# Patient Record
Sex: MALE | Race: WHITE | NOT HISPANIC OR LATINO | Employment: STUDENT | URBAN - METROPOLITAN AREA
[De-identification: names, ages, dates, MRNs, and addresses within clinical notes are randomized per-mention and may not be internally consistent; named-entity substitution may affect disease eponyms.]

---

## 2021-06-04 ENCOUNTER — TELEPHONE (OUTPATIENT)
Dept: ORTHOPEDICS | Facility: CLINIC | Age: 21
End: 2021-06-04

## 2021-06-04 DIAGNOSIS — M51.26 DISPLACEMENT OF LUMBAR INTERVERTEBRAL DISC WITHOUT MYELOPATHY: Primary | ICD-10-CM

## 2021-06-07 DIAGNOSIS — Z13.6 SCREENING FOR HEART DISEASE: ICD-10-CM

## 2021-06-08 ENCOUNTER — ANCILLARY PROCEDURE (OUTPATIENT)
Dept: MRI IMAGING | Facility: CLINIC | Age: 21
End: 2021-06-08
Attending: ORTHOPAEDIC SURGERY
Payer: COMMERCIAL

## 2021-06-08 DIAGNOSIS — M51.26 DISPLACEMENT OF LUMBAR INTERVERTEBRAL DISC WITHOUT MYELOPATHY: ICD-10-CM

## 2021-06-08 LAB — INTERPRETATION ECG - MUSE: NORMAL

## 2021-06-08 PROCEDURE — 72148 MRI LUMBAR SPINE W/O DYE: CPT | Performed by: RADIOLOGY

## 2021-06-09 ENCOUNTER — OFFICE VISIT (OUTPATIENT)
Dept: ORTHOPEDICS | Facility: CLINIC | Age: 21
End: 2021-06-09
Payer: COMMERCIAL

## 2021-06-09 VITALS
HEART RATE: 66 BPM | TEMPERATURE: 97.8 F | WEIGHT: 170 LBS | BODY MASS INDEX: 21.82 KG/M2 | SYSTOLIC BLOOD PRESSURE: 135 MMHG | HEIGHT: 74 IN | DIASTOLIC BLOOD PRESSURE: 81 MMHG

## 2021-06-09 DIAGNOSIS — G89.29 CHRONIC LEFT-SIDED LOW BACK PAIN WITH LEFT-SIDED SCIATICA: Primary | ICD-10-CM

## 2021-06-09 DIAGNOSIS — Z02.5 SPORTS PHYSICAL: Primary | ICD-10-CM

## 2021-06-09 DIAGNOSIS — M54.42 CHRONIC LEFT-SIDED LOW BACK PAIN WITH LEFT-SIDED SCIATICA: Primary | ICD-10-CM

## 2021-06-09 ASSESSMENT — MIFFLIN-ST. JEOR: SCORE: 1850.86

## 2021-06-09 NOTE — LETTER
Date:Leta 10, 2021      Patient was self referred, no letter generated. Do not send.        [facility] Health Information

## 2021-06-09 NOTE — LETTER
6/9/2021      RE: Francesco Oro  [address] [address] [facility] [address]       Service Date: 06/09/2021    CHIEF COMPLAINT:  Low back pain.    HISTORY OF PRESENT ILLNESS:  Francesco Oro is a 21-year-old incoming [employer]  with a long history of back issues.  He underwent a laminectomy by Dr. Nick Rosenberg at [facility] in 05/2020.  He was able to return to play with the [employer] in 12/2020.  He actually tells me his back pain has not been improved much from the surgery.  He may have slightly less pain in his left leg.  However, he still has some issues with extensive play.  He notes symptoms when he plays 3 games in a row.    Francesco denies numbness or tingling that goes below his knee.  He has no feelings of weakness.  He has no bowel or bladder issues.    PHYSICAL EXAMINATION:  21-year-old male, alert and oriented, in no apparent distress.  Full range of motion of the lumbar spine without discomfort.  Lower extremity strength 5/5.  He has a negative contralateral straight leg raise.  He does have some pain with ipsilateral straight leg raise.  He has a negative HANNAH test.    MRI:  I personally reviewed the patient's recent lumbar spine MRI dated 06/08.  The patient has evidence of a previous left sided hemilaminectomy.  There is some minimal disk bulging at L4-L5. 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    IMPRESSION:  21-year-old incoming  with low back pain following decompression surgery.  Francesco, his  and I had a long conversation about his back.  We discussed that this is a preexisting condition.  We discussed the fact that participating in Division I athletics may put excessive load on his spine and this could accelerate degenerative changes.  I asked Francesco if he would like to see one of our [facility] spine surgeons for an opinion.  At this point, he would like to wait and see how the next few weeks of training go.  I think this is reasonable.      PLAN:  1.  Francesco  will be cleared to participate from an orthopedic standpoint in hockey training .  2.  He will let us know if his symptoms worsen and he would like to get an opinion from one of my spine partners.    Kali Rubalcava MD        D: 2021   T: 06/10/2021   MT: domenic    Name:     RUDY COYNE  MRN:      [id number]        Account:      [id number]   :      2000           Service Date: 2021       Document: [id number]      Kali Rubalcava MD

## 2021-06-09 NOTE — LETTER
Date:June 14, 2021      Patient was self referred, no letter generated. Do not send.        [facility] Health Information

## 2021-06-09 NOTE — PROGRESS NOTES
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  He is generally healthy.  He does have a history of lumbar radiculopathy, he underwent surgery for radicular left leg pain prior to the last hockey season.  Unfortunately his pain is coming back.    He does report having a slightly abnormal EKG in the past, a physician had discussed this with him and cleared him as he was asymptomatic.    He is getting over a upper respiratory infection.    Vitals: /81   Pulse 66   Temp 97.8  F (36.6  C)   Ht 1.88 m (6' 2\")   Wt 77.1 kg (170 lb)   BMI 21.83 kg/m    BMI= Body mass index is 21.83 kg/m .  Sport(s): Ice Hockey    Vision: Right Eye: 20/20 Left Eye: 20/20 Both Eyes: 20/20  Correction: none  Pupils: equal    Concussions: Concussion fact sheet reviewed. Student Athlete gave written and verbal agreement to report any suspected concussions.    General/Medical  Eyes/Vision: Normal  Ears/Hearing: Normal  Nose: Normal  Mouth/Dental: Normal  Throat: petechiae at roof of mouth  Thyroid: Normal  Lymph Nodes: left cervical LAD  Lungs: Normal  Abdomen: Normal  Skin: Normal    Musculoskeletal/Orthopaedic  Per orthopedist    Cardiovascular Screening  Heart Murmur:No   Symmetric Femoral pulses: Yes    Stigmata of Marfan's Syndrome - if appropriate:  Not applicable    EKG read as septal infarct, likely artifact given his lack of symptomatology and prior history of an abnormal finding on his EKG.    COMMENTS, RECOMMENDATIONS and PARTICIPATION STATUS  Cleared for hockey participation from a medical perspective.    Bari Rea, DO ROSAM      "

## 2021-06-09 NOTE — LETTER
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  He is generally healthy.  He does have a history of lumbar radiculopathy, he underwent surgery for radicular left leg pain prior to the last hockey season.  Unfortunately his pain is coming back.    He does report having a slightly abnormal EKG in the past, a physician had discussed this with him and cleared him as he was asymptomatic.    He is getting over a upper respiratory infection.    Vitals: /81   Pulse 66   Temp 97.8  F (36.6  C)   Ht 1.88 m (6' 2\")   Wt 77.1 kg (170 lb)   BMI 21.83 kg/m    BMI= Body mass index is 21.83 kg/m .  Sport(s): Ice Hockey    Vision: Right Eye: 20/20 Left Eye: 20/20 Both Eyes: 20/20  Correction: none  Pupils: equal    Concussions: Concussion fact sheet reviewed. Student Athlete gave written and verbal agreement to report any suspected concussions.    General/Medical  Eyes/Vision: Normal  Ears/Hearing: Normal  Nose: Normal  Mouth/Dental: Normal  Throat: petechiae at roof of mouth  Thyroid: Normal  Lymph Nodes: left cervical LAD  Lungs: Normal  Abdomen: Normal  Skin: Normal    Musculoskeletal/Orthopaedic  Per orthopedist    Cardiovascular Screening  Heart Murmur:No   Symmetric Femoral pulses: Yes    Stigmata of Marfan's Syndrome - if appropriate:  Not applicable    EKG read as septal infarct, likely artifact given his lack of symptomatology and prior history of an abnormal finding on his EKG.    COMMENTS, RECOMMENDATIONS and PARTICIPATION STATUS  Cleared for hockey participation from a medical perspective.    Bari Rea DO CAVERÓNICAM          Prashant Rea, DO    "

## 2021-06-10 NOTE — PROGRESS NOTES
Service Date: 06/09/2021    CHIEF COMPLAINT:  Low back pain.    HISTORY OF PRESENT ILLNESS:  Francesco Oro is a 21-year-old incoming [x+1]  with a long history of back issues.  He underwent a laminectomy by Dr. Nick Rosenberg at [facility] [place] in 05/2020.  He was able to return to play with the [employer] in 12/2020.  He actually tells me his back pain has not been improved much from the surgery.  He may have slightly less pain in his left leg.  However, he still has some issues with extensive play.  He notes symptoms when he plays 3 games in a row.    Francesco denies numbness or tingling that goes below his knee.  He has no feelings of weakness.  He has no bowel or bladder issues.    PHYSICAL EXAMINATION:  21-year-old male, alert and oriented, in no apparent distress.  Full range of motion of the lumbar spine without discomfort.  Lower extremity strength 5/5.  He has a negative contralateral straight leg raise.  He does have some pain with ipsilateral straight leg raise.  He has a negative HANNAH test.    MRI:  I personally reviewed the patient's recent lumbar spine MRI dated 06/08.  The patient has evidence of a previous left sided hemilaminectomy.  There is some minimal disk bulging at L4-L5. 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    IMPRESSION:  21-year-old incoming  with low back pain following decompression surgery.  Francesco, his  and I had a long conversation about his back.  We discussed that this is a preexisting condition.  We discussed the fact that participating in Division I athletics may put excessive load on his spine and this could accelerate degenerative changes.  I asked Francesco if he would like to see one of our [facility] spine surgeons for an opinion.  At this point, he would like to wait and see how the next few weeks of training go.  I think this is reasonable.      PLAN:  1.  Francesco will be cleared to participate from an orthopedic standpoint in hockey training  .  2.  He will let us know if his symptoms worsen and he would like to get an opinion from one of my spine partners.    Kali Rubalcava MD        D: 2021   T: 06/10/2021   MT: domenic    Name:     RUDY COYNE  MRN:      [id number]        Account:      [id number]   :      2000           Service Date: 2021       Document: [id number]

## 2021-06-30 ENCOUNTER — ANCILLARY PROCEDURE (OUTPATIENT)
Dept: GENERAL RADIOLOGY | Facility: CLINIC | Age: 21
End: 2021-06-30
Attending: ORTHOPAEDIC SURGERY
Payer: COMMERCIAL

## 2021-06-30 ENCOUNTER — OFFICE VISIT (OUTPATIENT)
Dept: ORTHOPEDICS | Facility: CLINIC | Age: 21
End: 2021-06-30
Payer: COMMERCIAL

## 2021-06-30 DIAGNOSIS — Z47.89 ORTHOPEDIC AFTERCARE: ICD-10-CM

## 2021-06-30 DIAGNOSIS — S20.219D STERNAL CONTUSION, SUBSEQUENT ENCOUNTER: Primary | ICD-10-CM

## 2021-06-30 PROCEDURE — 71120 X-RAY EXAM BREASTBONE 2/>VWS: CPT | Performed by: RADIOLOGY

## 2021-06-30 NOTE — LETTER
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  He dropped a weight training apparatus on his chest.  [place] [facility] [place] where he underwent radiographs and CT scanning.  His images revealed no acute fracture.    Francesco's pain has improved.  However he still is having difficulty with physical activity.  He does not really have complaints of shortness of breath.  He has no difficulty swallowing.    Physical Examination:  21-year-old male alert oriented no apparent distress.  Slightly tender on the mid sternum.  Slightly tender on the anterior ribs.  No crepitation noted.    Imaging:  I personally reviewed his radiographs from today.  There is no evidence of sternal fracture.  There is no evidence of rib fracture.    Impression:  21-year-old man's  with a sternal contusion.  Francesco symptoms are slowly improving.  I think he can gradually increase his aerobic training.  He can skate when comfortable.  We can start a very graduated return to weight training.    Plan:  1. Graduated return to activity  2. Follow-up with me on a as needed basis  3. Cleared to play per his athletic trainers discretion.        Kali Rubalcava MD

## 2021-06-30 NOTE — LETTER
Date:July 15, 2021      Patient was self referred, no letter generated. Do not send.        [facility] Health Information

## 2021-07-04 NOTE — PROGRESS NOTES
Chief Complaint: Chest injury    Sport:  Men's hockey    History of Present Illness:  Francesco Oro is a 21-year-old go[employer]ip[employer]  who injured his sternum on June 17.  He dropped a weight training apparatus on his chest.  He was taken to [facility] emergency department where he underwent radiographs and CT scanning.  His images revealed no acute fracture.    Francesco's pain has improved.  However he still is having difficulty with physical activity.  He does not really have complaints of shortness of breath.  He has no difficulty swallowing.    Physical Examination:  21-year-old male alert oriented no apparent distress.  Slightly tender on the mid sternum.  Slightly tender on the anterior ribs.  No crepitation noted.    Imaging:  I personally reviewed his radiographs from today.  There is no evidence of sternal fracture.  There is no evidence of rib fracture.    Impression:  21-year-old man's  with a sternal contusion.  Francesco symptoms are slowly improving.  I think he can gradually increase his aerobic training.  He can skate when comfortable.  We can start a very graduated return to weight training.    Plan:  1. Graduated return to activity  2. Follow-up with me on a as needed basis  3. Cleared to play per his athletic trainers discretion.

## 2021-07-16 ENCOUNTER — DOCUMENTATION ONLY (OUTPATIENT)
Dept: FAMILY MEDICINE | Facility: CLINIC | Age: 21
End: 2021-07-16
Payer: COMMERCIAL

## 2022-01-08 NOTE — PROGRESS NOTES
[facility] ATHLETICS  Alicia ATC treatment plan note  Injury: Low back pain   Date of injury: 07.16.21    Date: 07.16.21  Treatment: mercedes Bob ATC

## 2022-01-11 DIAGNOSIS — U07.1 CLINICAL DIAGNOSIS OF COVID-19: Primary | ICD-10-CM

## 2022-01-16 ENCOUNTER — OFFICE VISIT (OUTPATIENT)
Dept: FAMILY MEDICINE | Facility: CLINIC | Age: 22
End: 2022-01-16
Payer: COMMERCIAL

## 2022-01-16 DIAGNOSIS — U07.1 CLINICAL DIAGNOSIS OF COVID-19: Primary | ICD-10-CM

## 2022-01-16 NOTE — LETTER
1/16/2022      RE: Francesco Oro  [address] [address] [address]       HISTORY OF PRESENT ILLNESS  Mr. Oro is a pleasant 21 year old year old male here for Covid clearance exam  No symptoms  Feels well  Completed isolation and cardiac testing      MEDICAL HISTORY  There is no problem list on file for this patient.      No current outpatient medications on file.       Allergies   Allergen Reactions     Penicillin G      Sulfa Drugs        No family history on file.  Social History     Socioeconomic History     Marital status: Single     Spouse name: Not on file     Number of children: Not on file     Years of education: Not on file     Highest education level: Not on file   Occupational History     Not on file   Tobacco Use     Smoking status: Not on file     Smokeless tobacco: Not on file   Substance and Sexual Activity     Alcohol use: Not on file     Drug use: Not on file     Sexual activity: Not on file   Other Topics Concern     Not on file   Social History Narrative     Not on file     Social Determinants of Health     Financial Resource Strain: Not on file   Food Insecurity: Not on file   Transportation Needs: Not on file   Physical Activity: Not on file   Stress: Not on file   Social Connections: Not on file   Intimate Partner Violence: Not on file   Housing Stability: Not on file       Additional medical/Social/Surgical histories reviewed in [facility] and updated as appropriate.     REVIEW OF SYSTEMS (1/16/2022)  10 point ROS of systems including Constitutional, Eyes, Respiratory, Cardiovascular, Gastroenterology, Genitourinary, Integumentary, Musculoskeletal, Psychiatric, Allergic/Immunologic were all negative except for pertinent positives noted in my HPI.     PHYSICAL EXAM  Exam:  Constitutional: healthy, alert and no distress  Head: Normocephalic. No masses, lesions, tenderness or abnormalities  Neck: Neck supple. No adenopathy. Thyroid symmetric, normal size,, Carotids without bruits.  ENT: ENT  exam normal, no neck nodes or sinus tenderness  Cardiovascular: negative, PMI normal. No lifts, heaves, or thrills. RRR. No murmurs, clicks gallops or rub  Respiratory: negative, Percussion normal. Good diaphragmatic excursion. Lungs clear  Gastrointestinal: Abdomen soft, non-tender. BS normal. No masses, organomegaly    Skin: no suspicious lesions or rashes  Neurologic: Gait normal. Reflexes normal and symmetric. Sensation grossly WNL.  Psychiatric: mentation appears normal and affect normal/bright  Hematologic/Lymphatic/Immunologic: Normal cervical lymph nodes  ASSESSMENT & PLAN  22 yo male s/p covid infection, improved, clearance exam    I independently reviewed the following imaging studies:  troponing and EKG are WNL  No concerns  Can RTP per ATC discussed with athlete  Cleared for participation  Appropriate PPE was utilized for prevention of spread of Covid-19.  Prashant Swain MD, CAM        Prashant Swain MD

## 2022-01-16 NOTE — LETTER
Date:February 1, 2022      Patient was self referred, no letter generated. Do not send.        [facility] Health Information

## 2022-01-17 ENCOUNTER — LAB (OUTPATIENT)
Dept: LAB | Facility: CLINIC | Age: 22
End: 2022-01-17
Payer: COMMERCIAL

## 2022-01-17 DIAGNOSIS — U07.1 CLINICAL DIAGNOSIS OF COVID-19: ICD-10-CM

## 2022-01-17 LAB — TROPONIN I SERPL HS-MCNC: 5 NG/L

## 2022-01-17 PROCEDURE — 36415 COLL VENOUS BLD VENIPUNCTURE: CPT | Performed by: PATHOLOGY

## 2022-01-17 PROCEDURE — 84484 ASSAY OF TROPONIN QUANT: CPT | Performed by: PATHOLOGY

## 2022-01-18 LAB
ATRIAL RATE - MUSE: 75 BPM
DIASTOLIC BLOOD PRESSURE - MUSE: NORMAL MMHG
INTERPRETATION ECG - MUSE: NORMAL
P AXIS - MUSE: 56 DEGREES
PR INTERVAL - MUSE: 178 MS
QRS DURATION - MUSE: 78 MS
QT - MUSE: 356 MS
QTC - MUSE: 397 MS
R AXIS - MUSE: 59 DEGREES
SYSTOLIC BLOOD PRESSURE - MUSE: NORMAL MMHG
T AXIS - MUSE: 46 DEGREES
VENTRICULAR RATE- MUSE: 75 BPM

## 2022-02-02 ENCOUNTER — OFFICE VISIT (OUTPATIENT)
Dept: ORTHOPEDICS | Facility: CLINIC | Age: 22
End: 2022-02-02
Payer: COMMERCIAL

## 2022-02-02 VITALS
SYSTOLIC BLOOD PRESSURE: 131 MMHG | HEIGHT: 72 IN | BODY MASS INDEX: 23.84 KG/M2 | DIASTOLIC BLOOD PRESSURE: 74 MMHG | HEART RATE: 65 BPM | WEIGHT: 176 LBS

## 2022-02-02 DIAGNOSIS — J45.20 MILD INTERMITTENT ASTHMA, UNSPECIFIED WHETHER COMPLICATED: Primary | ICD-10-CM

## 2022-02-02 ASSESSMENT — MIFFLIN-ST. JEOR: SCORE: 1846.33

## 2022-02-02 NOTE — LETTER
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  Francesco notes that this past weekend at the games he became fatigued quite easily and felt as if he had trouble exchanging air.  This would resolve whenever he got off of the ice, although his symptoms have been continuing on a reliable basis this week.  He feels this every time he gets onto the ice or exerts himself in the waiting room.  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  He also recently got over COVID-19.        Additional history: as documented    MEDICAL HISTORY  There is no problem list on file for this patient.      No current outpatient medications on file.       Allergies   Allergen Reactions     Penicillin G      Sulfa Drugs        No family history on file.    Additional medical/Social/Surgical histories reviewed in [facility] and updated as appropriate.        PHYSICAL EXAM    Vitals:    02/02/22 0820   BP: 131/74   Pulse: 65   Weight: 79.8 kg (176 lb)   Height: 1.829 m (6')     Physical Exam  Vitals reviewed. Exam conducted with a chaperone present.   Constitutional:       Appearance: Normal appearance.   Eyes:      Conjunctiva/sclera: Conjunctivae normal.   Cardiovascular:      Rate and Rhythm: Normal rate and regular rhythm.      Pulses: Normal pulses.      Heart sounds: Normal heart sounds. No murmur heard.      Pulmonary:      Effort: Pulmonary effort is normal. No respiratory distress.      Breath sounds: Normal breath sounds. No wheezing.   Neurological:      Mental Status: He is alert.   Psychiatric:         Mood and Affect: Mood normal.         Behavior: Behavior normal.              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    Francesco and I had a good discussion centering around likely etiologies for his symptoms.  His symptoms do seem relatable to exercise and/or cold-induced asthma.    At this point I do think it is most reasonable for him to get back on his Dulera and to take albuterol before every practice.  We did discuss that if this does not help his symptoms whatsoever coming into this weekend we could consider either changing inhalers or initiating a round of prednisone, if his symptoms worsen and become more persistent in his daily life.    It was a pleasure seeing Francesco today.    Prashant Rea DO, CAM  Primary Care Sports Medicine      This note was constructed using Dragon dictation software, please excuse any minor errors in spelling, grammar, or syntax.        Prashant Rea DO

## 2022-02-02 NOTE — PROGRESS NOTES
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  Francesco notes that this past weekend at the games he became fatigued quite easily and felt as if he had trouble exchanging air.  This would resolve whenever he got off of the ice, although his symptoms have been continuing on a reliable basis this week.  He feels this every time he gets onto the ice or exerts himself in the waiting room.  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  He also recently got over COVID-19.        Additional history: as documented    MEDICAL HISTORY  There is no problem list on file for this patient.      No current outpatient medications on file.       Allergies   Allergen Reactions     Penicillin G      Sulfa Drugs        No family history on file.    Additional medical/Social/Surgical histories reviewed in [facility] and updated as appropriate.        PHYSICAL EXAM    Vitals:    02/02/22 0820   BP: 131/74   Pulse: 65   Weight: 79.8 kg (176 lb)   Height: 1.829 m (6')     Physical Exam  Vitals reviewed. Exam conducted with a chaperone present.   Constitutional:       Appearance: Normal appearance.   Eyes:      Conjunctiva/sclera: Conjunctivae normal.   Cardiovascular:      Rate and Rhythm: Normal rate and regular rhythm.      Pulses: Normal pulses.      Heart sounds: Normal heart sounds. No murmur heard.      Pulmonary:      Effort: Pulmonary effort is normal. No respiratory distress.      Breath sounds: Normal breath sounds. No wheezing.   Neurological:      Mental Status: He is alert.   Psychiatric:         Mood and Affect: Mood normal.         Behavior: Behavior normal.              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    Francesco and I had a good discussion centering around likely  etiologies for his symptoms.  His symptoms do seem relatable to exercise and/or cold-induced asthma.    At this point I do think it is most reasonable for him to get back on his Dulera and to take albuterol before every practice.  We did discuss that if this does not help his symptoms whatsoever coming into this weekend we could consider either changing inhalers or initiating a round of prednisone, if his symptoms worsen and become more persistent in his daily life.    It was a pleasure seeing Francesco today.    Prashant Rea DO, [facility]  Primary Care Sports Medicine      This note was constructed using Dragon dictation software, please excuse any minor errors in spelling, grammar, or syntax.

## 2022-02-02 NOTE — LETTER
Date:February 3, 2022      Patient was self referred, no letter generated. Do not send.        [facility] Health Information

## 2022-07-19 DIAGNOSIS — M54.16 LUMBAR RADICULOPATHY: Primary | ICD-10-CM

## 2022-07-19 RX ORDER — PREDNISONE 20 MG/1
40 TABLET ORAL DAILY
Qty: 10 TABLET | Refills: 0 | Status: SHIPPED | OUTPATIENT
Start: 2022-07-19 | End: 2022-07-24

## 2022-07-22 ENCOUNTER — ANCILLARY PROCEDURE (OUTPATIENT)
Dept: MRI IMAGING | Facility: CLINIC | Age: 22
End: 2022-07-22
Attending: FAMILY MEDICINE
Payer: COMMERCIAL

## 2022-07-22 DIAGNOSIS — M54.50 LUMBAR SPINE PAIN: ICD-10-CM

## 2022-07-22 PROCEDURE — 72148 MRI LUMBAR SPINE W/O DYE: CPT | Mod: GC | Performed by: RADIOLOGY

## 2022-08-26 DIAGNOSIS — M54.16 LUMBAR RADICULOPATHY: Primary | ICD-10-CM

## 2022-08-26 DIAGNOSIS — M51.26 HERNIATED LUMBAR INTERVERTEBRAL DISC: ICD-10-CM

## 2022-08-26 NOTE — TELEPHONE ENCOUNTER
DIAGNOSIS: Dr. Swain.  for [facility]. discussion and planning on treatment for some herniated discs in his lumbar spine. RN called and spoke with CHARIS Skaggs   APPOINTMENT DATE: 8/30/22   NOTES STATUS DETAILS   OFFICE NOTE from referring provider Internal Phone note 7/21/22- Álvaro   OFFICE NOTE from other specialist Internal [facility] ov/notes 2/2/22-6/9/21   LABS     MRI Internal MR Lumbar spine done 7/22/22  MR Lumbar spine done 6/8/21   XRAYS (IMAGES & REPORTS) Internal XR Lumbar Spine done 8/26/22   TUMOR

## 2022-08-29 NOTE — PROGRESS NOTES
Spine Surgery Consultation    REFERRING PHYSICIAN: No ref. provider found   PRIMARY CARE PHYSICIAN: [employer]           Chief Complaint:   Consult (referred by Dr. Swain.  for [facility]. 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 The patient is s/p L4-5 microdiscectomy and laminectomy with Dr. Rosenberg at [facility] in 5/2020. Prior to that surgery he had conservative management with steroid injections x4, PT, and gabapentin with minimal to no relief. Following surgery, he felt mild to no relief in back pain and left lower extremity radicular symptoms. Today, he is presenting following a referal by Dr. Swain for management of lumbar disc herniations seen on MRI and persistent lower back pain that radiates down his left lower extremity . Symptoms include pain radiating down posterior thighs. Aggravating activities include sitting for prolonged periods of time. Alleviating activities include rest.  The patient is currently active training with the [employer].  He reports that aggravation of symptoms while playing hockey.         Past Medical History:   No past medical history on file.         Past Surgical History:   No past surgical history on file.         Social History:     Social History     Tobacco Use     Smoking status: Not on file     Smokeless tobacco: Not on file   Substance Use Topics     Alcohol use: Not on file            Family History:   No family history on file.         Allergies:     Allergies   Allergen Reactions     Penicillin G      Sulfa Drugs             Medications:     No current outpatient medications on file.     No current facility-administered medications for this  visit.             Review of Systems:     A 10 point ROS was performed and reviewed. Specific responses to these questions are noted at the end of the document.         Physical Exam:   Vitals: Ht 1.829 m (6')   Wt 80.7 kg (178 lb)   BMI 24.14 kg/m    Constitutional: awake, alert, cooperative, no apparent distress, appears stated age.    Eyes: The sclera are white.  Ears, Nose, Throat: The trachea is midline.  Psychiatric: The patient has a normal affect.  Respiratory: breathing non-labored  Cardiovascular: The extremities are warm and perfused.  Skin: no obvious rashes or lesions.  Musculoskeletal, Neurologic, and Spine:      Lumbar Spine:    Appearance - No gross stepoffs or deformities    Motor -     L2-3: Hip flexion R 5/5  And L 5/5 strength          L3/4:  Knee extension R 5/5 and L 5/5 strength         L4/5:  Foot dorsiflexion R 5/5 L 5/5 and       EHL dorsiflexion R 4/5 L 4/5 strength         S1:  Plantarflexion/Peroneal Muscles  R 5/5 and L 5/5 strength    Sensation: intact to light touch L3-S1 distribution BLE          Neurologic:      REFLEXES Right Left   Patella 2+ 2+   Ankle jerk 2+ 2+   Babinski No upgoing great toe No upgoing great toe   Clonus 0 beats 0 beats     Hip Exam:  No pain with hip log roll and no tenderness over the greater trochanters.    Alignment:  Patient stands with a neutral standing sagittal balance.           Imaging:   We ordered and independently reviewed new radiographs at this clinic visit. The results were discussed with the patient.  Findings include:    7/22/22 RMI   1.  Postsurgical changes of left hemilaminectomy L4-5 similar to  prior.  2.  Mild lumbar spondylosis at L4-5 and L5-S1. While the small disc  bulge and left subarticular protrusion at L5-S1 is mildly increased  from 6/8/2021, there is no resultant spinal canal or neural foraminal  stenosis.             Assessment and Plan:   Assessment:  22 year old male s/p L4-5 microdiscectomy and laminectomy with Dr. Rosenberg  at [facility] in 5/2020 presents following a referal by Dr. Swain for management of lumbar disc herniations seen on MRI.  The patient's imaging findings are mild with no significant deformity, compression of the nerve, instability/fracture, or residual disc material from his previous surgery.  He is not as candidate for surgical intervention at this time.  Discussed nonsurgical treatment options including physical therapy, medications, injections, massage therapy.  Physical therapy likely would not add much to the patient's intensive workout regiment with overall activity. Patient will discuss glut/hamstring strengthening exercises and message therapy with his . If no improvement following these interventions, the patient may return to clinic as needed and we will schedule a steroid injection at that time.     Mansoor Gamboa MD   Orthopaedic resident, PGY-1    Attending MD (Dr. Leoncio Jimenez) :  I reviewed and verified the history and physical exam of the patient and discussed the patient's management with the other clinical providers involved in this patient's care including any involved residents or physicians assistants. I reviewed the above note and agree with the documented findings and plan of care, which were communicated to the patient.      Leoncio Jimenez MD        Respectfully,  Leoncio Jimenez MD  Spine Surgery  [facility]

## 2022-08-30 ENCOUNTER — PRE VISIT (OUTPATIENT)
Dept: ORTHOPEDICS | Facility: CLINIC | Age: 22
End: 2022-08-30

## 2022-08-30 ENCOUNTER — OFFICE VISIT (OUTPATIENT)
Dept: ORTHOPEDICS | Facility: CLINIC | Age: 22
End: 2022-08-30
Payer: COMMERCIAL

## 2022-08-30 ENCOUNTER — ANCILLARY PROCEDURE (OUTPATIENT)
Dept: GENERAL RADIOLOGY | Facility: CLINIC | Age: 22
End: 2022-08-30
Attending: ORTHOPAEDIC SURGERY
Payer: COMMERCIAL

## 2022-08-30 VITALS — BODY MASS INDEX: 24.11 KG/M2 | WEIGHT: 178 LBS | HEIGHT: 72 IN

## 2022-08-30 DIAGNOSIS — M54.16 LUMBAR RADICULOPATHY: ICD-10-CM

## 2022-08-30 DIAGNOSIS — M51.26 HERNIATED LUMBAR INTERVERTEBRAL DISC: ICD-10-CM

## 2022-08-30 DIAGNOSIS — M54.16 LUMBAR RADICULOPATHY: Primary | ICD-10-CM

## 2022-08-30 PROCEDURE — 72110 X-RAY EXAM L-2 SPINE 4/>VWS: CPT | Performed by: STUDENT IN AN ORGANIZED HEALTH CARE EDUCATION/TRAINING PROGRAM

## 2022-08-30 PROCEDURE — 99203 OFFICE O/P NEW LOW 30 MIN: CPT | Mod: GC | Performed by: ORTHOPAEDIC SURGERY

## 2022-08-30 NOTE — NURSING NOTE
Reason For Visit:   Chief Complaint   Patient presents with     Consult     referred by Dr. Swain.  for [facility]. discussion and planning on treatment for some herniated discs in his lumbar spine       Primary MD: [employer]  Ref. MD: Dr. Swain    ?  No  Occupation: full time student   Currently working? No.  Date of surgery: Early may   Type of surgery: Microdiscectomy L4-L5  Smoker: No  Request smoking cessation information: No    Ht 1.829 m (6')   Wt 80.7 kg (178 lb)   BMI 24.14 kg/m      Pain Assessment  Patient Currently in Pain: Yes  Patient's Stated Pain Goal: 2 (off and on)    Oswestry (CHERI) Questionnaire    No flowsheet data found.         Neck Disability Index (NDI) Questionnaire    No flowsheet data found.                Promis 10 Assessment    No flowsheet data found.             Parul Oglesby

## 2022-08-30 NOTE — LETTER
Date:September 2, 2022      Provider requested that no letter be sent. Do not send.       [facility]

## 2022-08-30 NOTE — LETTER
8/30/2022         RE: Francesco Oro  [address] [address]        Dear Colleague,    Thank you for referring your patient, Francesco Oro, to the [facility] [place]. Please see a copy of my visit note below.    Spine Surgery Consultation    REFERRING PHYSICIAN: No ref. provider found   PRIMARY CARE PHYSICIAN: [employer]           Chief Complaint:   Consult (referred by Dr. Swain.  for [facility]. 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 The patient is s/p L4-5 microdiscectomy and laminectomy with Dr. Rosenberg at [facility] in 5/2020. Prior to that surgery he had conservative management with steroid injections x4, PT, and gabapentin with minimal to no relief. Following surgery, he felt mild to no relief in back pain and left lower extremity radicular symptoms. Today, he is presenting following a referal by Dr. Swain for management of lumbar disc herniations seen on MRI and persistent lower back pain that radiates down his left lower extremity . Symptoms include pain radiating down posterior thighs. Aggravating activities include sitting for prolonged periods of time. Alleviating activities include rest.  The patient is currently active training with the [employer].  He reports that aggravation of symptoms while playing hockey.         Past Medical History:   No past medical history on file.         Past Surgical History:   No past surgical history on file.         Social History:     Social History     Tobacco Use     Smoking status: Not on file     Smokeless tobacco: Not on file   Substance Use Topics     Alcohol use: Not on file             Family History:   No family history on file.         Allergies:     Allergies   Allergen Reactions     Penicillin G      Sulfa Drugs             Medications:     No current outpatient medications on file.     No current facility-administered medications for this visit.             Review of Systems:     A 10 point ROS was performed and reviewed. Specific responses to these questions are noted at the end of the document.         Physical Exam:   Vitals: Ht 1.829 m (6')   Wt 80.7 kg (178 lb)   BMI 24.14 kg/m    Constitutional: awake, alert, cooperative, no apparent distress, appears stated age.    Eyes: The sclera are white.  Ears, Nose, Throat: The trachea is midline.  Psychiatric: The patient has a normal affect.  Respiratory: breathing non-labored  Cardiovascular: The extremities are warm and perfused.  Skin: no obvious rashes or lesions.  Musculoskeletal, Neurologic, and Spine:      Lumbar Spine:    Appearance - No gross stepoffs or deformities    Motor -     L2-3: Hip flexion R 5/5  And L 5/5 strength          L3/4:  Knee extension R 5/5 and L 5/5 strength         L4/5:  Foot dorsiflexion R 5/5 L 5/5 and       EHL dorsiflexion R 4/5 L 4/5 strength         S1:  Plantarflexion/Peroneal Muscles  R 5/5 and L 5/5 strength    Sensation: intact to light touch L3-S1 distribution BLE          Neurologic:      REFLEXES Right Left   Patella 2+ 2+   Ankle jerk 2+ 2+   Babinski No upgoing great toe No upgoing great toe   Clonus 0 beats 0 beats     Hip Exam:  No pain with hip log roll and no tenderness over the greater trochanters.    Alignment:  Patient stands with a neutral standing sagittal balance.           Imaging:   We ordered and independently reviewed new radiographs at this clinic visit. The results were discussed with the patient.  Findings include:    7/22/22 RMI   1.  Postsurgical changes of left hemilaminectomy L4-5 similar to  prior.  2.  Mild lumbar spondylosis at L4-5 and L5-S1. While the small disc  bulge  and left subarticular protrusion at L5-S1 is mildly increased  from 6/8/2021, there is no resultant spinal canal or neural foraminal  stenosis.             Assessment and Plan:   Assessment:  22 year old male s/p L4-5 microdiscectomy and laminectomy with Dr. Rosenberg at [facility] in 5/2020 presents following a referal by Dr. Swain for management of lumbar disc herniations seen on MRI.  The patient's imaging findings are mild with no significant deformity, compression of the nerve, instability/fracture, or residual disc material from his previous surgery.  He is not as candidate for surgical intervention at this time.  Discussed nonsurgical treatment options including physical therapy, medications, injections, massage therapy.  Physical therapy likely would not add much to the patient's intensive workout regiment with overall activity. Patient will discuss glut/hamstring strengthening exercises and message therapy with his . If no improvement following these interventions, the patient may return to clinic as needed and we will schedule a steroid injection at that time.     Mansoor Gamboa MD   Orthopaedic resident, PGY-1    Attending MD (Dr. Leoncio Jimenez) :  I reviewed and verified the history and physical exam of the patient and discussed the patient's management with the other clinical providers involved in this patient's care including any involved residents or physicians assistants. I reviewed the above note and agree with the documented findings and plan of care, which were communicated to the patient.      Leoncio Jimenez MD        Respectfully,  Leoncio Jimenez MD  Spine Surgery  [facility]      Again, thank you for allowing me to participate in the care of your patient.        Sincerely,        Leoncio Jimenez MD

## 2022-09-20 ENCOUNTER — TELEPHONE (OUTPATIENT)
Dept: ORTHOPEDICS | Facility: CLINIC | Age: 22
End: 2022-09-20

## 2022-09-20 DIAGNOSIS — M51.26 HERNIATED LUMBAR INTERVERTEBRAL DISC: ICD-10-CM

## 2022-09-20 DIAGNOSIS — M54.16 LUMBAR RADICULOPATHY: Primary | ICD-10-CM

## 2022-09-20 NOTE — TELEPHONE ENCOUNTER
RN called and spoke with patient. Got a call from Dr. Swain, stating this patient needed an injection.  RN consulted with Dr. Jimenez and then placed the order.  RN faxed order to [facility].  RN explained in great detail to patient regarding this injection. If [facility] is not part of his insurance network, then pt will have to do injection with [facility].  Patient expressed understanding.    Rehan Zambrano RN

## 2022-09-21 ENCOUNTER — TELEPHONE (OUTPATIENT)
Dept: ORTHOPEDICS | Facility: CLINIC | Age: 22
End: 2022-09-21

## 2022-09-21 NOTE — TELEPHONE ENCOUNTER
RN faxed injection order to [facility].    Rehan Zambrano RN        [facility]    Phone Message    May a detailed message be left on voicemail: yes     Reason for Call: Other: [facility] needs orders faxed to them for lumbar inj fax is [phone]     Action Taken: Other: ortho     Travel Screening: Not Applicable

## 2022-11-05 ENCOUNTER — OFFICE VISIT (OUTPATIENT)
Dept: ORTHOPEDICS | Facility: CLINIC | Age: 22
End: 2022-11-05
Payer: COMMERCIAL

## 2022-11-05 DIAGNOSIS — M25.532 LEFT WRIST PAIN: Primary | ICD-10-CM

## 2022-11-05 NOTE — LETTER
11/5/2022      RE: Francesco Oro  [address] [address] [address]     Dear Colleague,    Thank you for referring your patient, Francesco Oro, to the [facility]. Please see a copy of my visit note below.    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  Francesco has pain in his left wrist that is somewhat chronic, he points to the dorsal aspect of his wrist.  This is worse in loaded extension and flexion, worse with stick handling.  He has noticed that his pain has moved central lately, as opposed to at the ulnar aspect, which is where his pain was previously.  Pain also radiates up his forearm somewhat to his elbow.        Additional history: as documented    MEDICAL HISTORY  There is no problem list on file for this patient.      Current Outpatient Medications   Medication Sig Dispense Refill     meloxicam (MOBIC) 15 MG tablet Take 1 tablet (15 mg) by mouth daily Take daily for 7 days, then daily as needed 30 tablet 0       Allergies   Allergen Reactions     Penicillin G      Sulfa Drugs        No family history on file.    Additional medical/Social/Surgical histories reviewed in EPIC and updated as appropriate.        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    Francesco's pain is near the lunate and the scapholunate junction.  Given his swelling and pain with motion I'd  recommend a brief course of anti-inflammatories on a scheduled basis.  I am prescribing meloxicam for him to take every day for a week, then on an as-needed basis after this.  He is also going to continue conservative treatment with his .    In the future we could consider imaging if this is not improving.    It was a pleasure seeing Francesco today.    Prashant Rea DO, [facility]  Primary Care Sports Medicine      This note was constructed using Dragon dictation software, please excuse any minor errors in spelling, grammar, or syntax.        Again, thank you for allowing me to participate in the care of your patient.      Sincerely,    Prashant Rea DO

## 2022-11-05 NOTE — LETTER
Date:November 8, 2022      Patient was self referred, no letter generated. Do not send.        [facility] Health Information

## 2022-11-06 RX ORDER — MELOXICAM 15 MG/1
15 TABLET ORAL DAILY
Qty: 30 TABLET | Refills: 0 | Status: SHIPPED | OUTPATIENT
Start: 2022-11-06

## 2022-11-07 NOTE — PROGRESS NOTES
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  Francesco has pain in his left wrist that is somewhat chronic, he points to the dorsal aspect of his wrist.  This is worse in loaded extension and flexion, worse with stick handling.  He has noticed that his pain has moved central lately, as opposed to at the ulnar aspect, which is where his pain was previously.  Pain also radiates up his forearm somewhat to his elbow.        Additional history: as documented    MEDICAL HISTORY  There is no problem list on file for this patient.      Current Outpatient Medications   Medication Sig Dispense Refill     meloxicam (MOBIC) 15 MG tablet Take 1 tablet (15 mg) by mouth daily Take daily for 7 days, then daily as needed 30 tablet 0       Allergies   Allergen Reactions     Penicillin G      Sulfa Drugs        No family history on file.    Additional medical/Social/Surgical histories reviewed in EPIC and updated as appropriate.        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    Francesco's pain is near the lunate and the scapholunate junction.  Given his swelling and pain with motion I'd recommend a brief course of anti-inflammatories on a scheduled basis.  I am prescribing meloxicam for him to take every day for a week, then on an as-needed basis after this.  He is also going to continue conservative treatment with his  .    In the future we could consider imaging if this is not improving.    It was a pleasure seeing Francesco today.    Prashant Rea DO, [facility]  Primary Care Sports Medicine      This note was constructed using Dragon dictation software, please excuse any minor errors in spelling, grammar, or syntax.

## 2022-11-15 ENCOUNTER — ANCILLARY PROCEDURE (OUTPATIENT)
Dept: GENERAL RADIOLOGY | Facility: CLINIC | Age: 22
End: 2022-11-15
Attending: FAMILY MEDICINE
Payer: COMMERCIAL

## 2022-11-15 ENCOUNTER — OFFICE VISIT (OUTPATIENT)
Dept: ORTHOPEDICS | Facility: CLINIC | Age: 22
End: 2022-11-15
Payer: COMMERCIAL

## 2022-11-15 DIAGNOSIS — R05.2 SUBACUTE COUGH: Primary | ICD-10-CM

## 2022-11-15 DIAGNOSIS — J06.9 UPPER RESPIRATORY TRACT INFECTION, UNSPECIFIED TYPE: ICD-10-CM

## 2022-11-15 DIAGNOSIS — R05.2 SUBACUTE COUGH: ICD-10-CM

## 2022-11-15 PROCEDURE — 71046 X-RAY EXAM CHEST 2 VIEWS: CPT | Mod: GC | Performed by: RADIOLOGY

## 2022-11-15 PROCEDURE — 99214 OFFICE O/P EST MOD 30 MIN: CPT | Performed by: FAMILY MEDICINE

## 2022-11-15 RX ORDER — PREDNISONE 20 MG/1
40 TABLET ORAL DAILY
Qty: 10 TABLET | Refills: 0 | Status: SHIPPED | OUTPATIENT
Start: 2022-11-15

## 2022-11-15 RX ORDER — AZITHROMYCIN 250 MG/1
TABLET, FILM COATED ORAL
Qty: 6 TABLET | Refills: 0 | Status: SHIPPED | OUTPATIENT
Start: 2022-11-15 | End: 2022-11-20

## 2022-11-15 RX ORDER — PREDNISONE 20 MG/1
40 TABLET ORAL DAILY
Qty: 10 TABLET | Refills: 0 | Status: SHIPPED | OUTPATIENT
Start: 2022-11-15 | End: 2022-11-15

## 2022-11-15 NOTE — PROGRESS NOTES
HISTORY OF PRESENT ILLNESS  Mr. Oro is a pleasant 22 year old male who presents to clinic today with a cough and congestion.  Francesco started to feel mildly sick on Thursday, he has since developed a cough, which is his most bothersome symptom by far.  He coughs frequently, it is painful and sometimes productive when he does cough.  He has been using guaifenesin, Tylenol, and over-the-counter cough remedies.  He did have a fever at onset, this has since subsided.        Additional history: as documented    MEDICAL HISTORY  There is no problem list on file for this patient.      Current Outpatient Medications   Medication Sig Dispense Refill     azithromycin (ZITHROMAX) 250 MG tablet Take 2 tablets (500 mg) by mouth daily for 1 day, THEN 1 tablet (250 mg) daily for 4 days. 6 tablet 0     meloxicam (MOBIC) 15 MG tablet Take 1 tablet (15 mg) by mouth daily Take daily for 7 days, then daily as needed 30 tablet 0       Allergies   Allergen Reactions     Penicillin G      Sulfa Drugs        No family history on file.    Additional medical/Social/Surgical histories reviewed in [facility] and updated as appropriate.        PHYSICAL EXAM  Physical Exam  Vitals reviewed.   Constitutional:       Appearance: Normal appearance.   HENT:      Nose: Nose normal. No congestion or rhinorrhea.      Mouth/Throat:      Mouth: Mucous membranes are moist.      Pharynx: Posterior oropharyngeal erythema present. No oropharyngeal exudate.   Eyes:      Pupils: Pupils are equal, round, and reactive to light.   Cardiovascular:      Rate and Rhythm: Normal rate and regular rhythm.      Pulses: Normal pulses.      Heart sounds: Normal heart sounds. No murmur heard.  Pulmonary:      Effort: Pulmonary effort is normal.      Breath sounds: Examination of the right-lower field reveals decreased breath sounds. Examination of the left-lower field reveals decreased breath sounds. Decreased breath sounds present.   Lymphadenopathy:      Cervical: Cervical  adenopathy present.   Skin:     General: Skin is warm and dry.   Neurological:      Mental Status: He is alert.                ASSESSMENT & PLAN  Mr. Oro is a 22 year old male who presents to clinic today with a cough and congestion.    I am ordering a chest x-ray, I will get in touch with him with the results.    I am also ordering azithromycin and prednisone for him to start today.    If Francesco is not feeling better over the course of the next 24 to 48 hours we may consider blood work and testing him for less common illnesses.    It was a pleasure seeing Francesco today.    Prashant Rea DO, [facility]  Primary Care Sports Medicine      This note was constructed using Dragon dictation software, please excuse any minor errors in spelling, grammar, or syntax.

## 2022-11-15 NOTE — LETTER
Date:November 16, 2022      Patient was self referred, no letter generated. Do not send.        [facility] Health Information

## 2022-11-15 NOTE — LETTER
11/15/2022      RE: Francesco Oro  [address] [facility] [id number]     Dear Colleague,    Thank you for referring your patient, Francesco Oro, to the [facility] [place]. Please see a copy of my visit note below.      HISTORY OF PRESENT ILLNESS  Mr. Oro is a pleasant 22 year old male who presents to clinic today with a cough and congestion.  Francesco started to feel mildly sick on Thursday, he has since developed a cough, which is his most bothersome symptom by far.  He coughs frequently, it is painful and sometimes productive when he does cough.  He has been using guaifenesin, Tylenol, and over-the-counter cough remedies.  He did have a fever at onset, this has since subsided.        Additional history: as documented    MEDICAL HISTORY  There is no problem list on file for this patient.      Current Outpatient Medications   Medication Sig Dispense Refill     azithromycin (ZITHROMAX) 250 MG tablet Take 2 tablets (500 mg) by mouth daily for 1 day, THEN 1 tablet (250 mg) daily for 4 days. 6 tablet 0     meloxicam (MOBIC) 15 MG tablet Take 1 tablet (15 mg) by mouth daily Take daily for 7 days, then daily as needed 30 tablet 0       Allergies   Allergen Reactions     Penicillin G      Sulfa Drugs        No family history on file.    Additional medical/Social/Surgical histories reviewed in [facility] and updated as appropriate.        PHYSICAL EXAM  Physical Exam  Vitals reviewed.   Constitutional:       Appearance: Normal appearance.   HENT:      Nose: Nose normal. No congestion or rhinorrhea.      Mouth/Throat:      Mouth: Mucous membranes are moist.      Pharynx: Posterior oropharyngeal erythema present. No oropharyngeal exudate.   Eyes:      Pupils: Pupils are equal, round, and reactive to light.   Cardiovascular:      Rate and Rhythm: Normal rate and regular rhythm.      Pulses: Normal pulses.      Heart sounds: Normal heart sounds. No murmur heard.  Pulmonary:      Effort:  Pulmonary effort is normal.      Breath sounds: Examination of the right-lower field reveals decreased breath sounds. Examination of the left-lower field reveals decreased breath sounds. Decreased breath sounds present.   Lymphadenopathy:      Cervical: Cervical adenopathy present.   Skin:     General: Skin is warm and dry.   Neurological:      Mental Status: He is alert.                ASSESSMENT & PLAN  Mr. Oro is a 22 year old male who presents to clinic today with a cough and congestion.    I am ordering a chest x-ray, I will get in touch with him with the results.    I am also ordering azithromycin and prednisone for him to start today.    If Francesco is not feeling better over the course of the next 24 to 48 hours we may consider blood work and testing him for less common illnesses.    It was a pleasure seeing Francesco today.    Prashant Rea DO, [facility]  Primary Care Sports Medicine      This note was constructed using Dragon dictation software, please excuse any minor errors in spelling, grammar, or syntax.        Again, thank you for allowing me to participate in the care of your patient.      Sincerely,    Prashant Rea DO

## 2022-11-19 DIAGNOSIS — J45.41 MODERATE PERSISTENT ASTHMA WITH ACUTE EXACERBATION: Primary | ICD-10-CM

## 2022-11-19 RX ORDER — BECLOMETHASONE DIPROPIONATE HFA 80 UG/1
1 AEROSOL, METERED RESPIRATORY (INHALATION) 2 TIMES DAILY
Qty: 10.6 G | Refills: 0 | Status: SHIPPED | OUTPATIENT
Start: 2022-11-19 | End: 2023-10-23

## 2022-11-20 ENCOUNTER — OFFICE VISIT (OUTPATIENT)
Dept: ORTHOPEDICS | Facility: CLINIC | Age: 22
End: 2022-11-20
Payer: COMMERCIAL

## 2022-11-20 DIAGNOSIS — R05.2 SUBACUTE COUGH: ICD-10-CM

## 2022-11-20 DIAGNOSIS — J06.9 UPPER RESPIRATORY TRACT INFECTION, UNSPECIFIED TYPE: Primary | ICD-10-CM

## 2022-11-20 RX ORDER — FLUTICASONE PROPIONATE 50 MCG
1 SPRAY, SUSPENSION (ML) NASAL 2 TIMES DAILY
Qty: 9.9 ML | Refills: 0 | Status: SHIPPED | OUTPATIENT
Start: 2022-11-20

## 2022-11-20 RX ORDER — BENZONATATE 100 MG/1
100 CAPSULE ORAL 3 TIMES DAILY PRN
Qty: 30 CAPSULE | Refills: 0 | Status: SHIPPED | OUTPATIENT
Start: 2022-11-20

## 2022-11-20 NOTE — LETTER
Date:November 21, 2022      Patient was self referred, no letter generated. Do not send.        [facility] Health Information

## 2022-11-20 NOTE — PROGRESS NOTES
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  He was able to travel and make the trip to [place] for his hockey game, although when he skates for 5 minutes or longer he begins to feel extreme fatigue and increasing, worsening cough.  He is sleeping better, he denies any fever, chills, or other systemic symptoms.     PHYSICAL EXAM  Physical Exam  Constitutional:       Appearance: Normal appearance.   HENT:      Right Ear: Tympanic membrane normal.      Left Ear: Tympanic membrane normal.      Nose: Nose normal.      Mouth/Throat:      Pharynx: Oropharynx is clear. No oropharyngeal exudate or posterior oropharyngeal erythema.   Eyes:      Conjunctiva/sclera: Conjunctivae normal.   Cardiovascular:      Rate and Rhythm: Normal rate and regular rhythm.      Pulses: Normal pulses.      Heart sounds: Normal heart sounds. No murmur heard.  Pulmonary:      Effort: Pulmonary effort is normal. No respiratory distress.      Breath sounds: Normal breath sounds. No wheezing.   Lymphadenopathy:      Cervical: Cervical adenopathy present.   Skin:     General: Skin is warm and dry.   Neurological:      General: No focal deficit present.      Mental Status: He is alert. Mental status is at baseline.   Psychiatric:         Mood and Affect: Mood normal.         Thought Content: Thought content normal.           ASSESSMENT & PLAN  Mr. Oro is a 22 year old male following up with an upper respiratory infection.    To date, Francesco has completed 5 days of prednisone and azithromycin, today being his fifth day of 5 for these medications.  He has also had a reassuring chest x-ray.  Unfortunately his symptoms are persistent.    I did reassure him that there is therapeutic effect that is going to continue over the course of the next few days, although I do recommend treating with either  an albuterol rescue inhaler, or an albuterol nebulizer.  I am also prescribing Flonase for him to take twice daily.  He should also continue to take his asthma controller inhaler.  I am prescribing him Tessalon perles as well, for when his cough is severe.    We should follow-up in the next few days if he is not improving.    It was a pleasure seeing Mckenna Rea, , CAM      This note was constructed using Dragon dictation software, please excuse any minor errors in spelling, grammar, or syntax.

## 2022-11-21 DIAGNOSIS — J45.41 MODERATE PERSISTENT ASTHMA WITH ACUTE EXACERBATION: Primary | ICD-10-CM

## 2022-12-09 ENCOUNTER — OFFICE VISIT (OUTPATIENT)
Dept: ORTHOPEDICS | Facility: CLINIC | Age: 22
End: 2022-12-09
Payer: COMMERCIAL

## 2022-12-09 DIAGNOSIS — J45.41 MODERATE PERSISTENT ASTHMA WITH ACUTE EXACERBATION: Primary | ICD-10-CM

## 2022-12-09 NOTE — LETTER
12/9/2022      RE: Francesco Oro  [address] [facility] [address]     Dear Colleague,    Thank you for referring your patient, Francesco Oro, to the [facility]. Please see a copy of my visit note below.    HISTORY OF PRESENT ILLNESS  Mr. Oro is a pleasant 22 year old male following up with   Francesco      PHYSICAL EXAM  There were no vitals filed for this visit.    ASSESSMENT & PLAN  Mr. Oro is a 22 year old male following up with    I ordered & reviewed       Francesco should follow up in weeks.  He should follow up sooner if the condition worsens or if other problems arise.    It was a pleasure seeing Francesco.        Prashant Rea DO, CA[facility]      This note was constructed using Dragon dictation software, please excuse any minor errors in spelling, grammar, or syntax.        Again, thank you for allowing me to participate in the care of your patient.      Sincerely,    Prashant Rea DO

## 2022-12-10 RX ORDER — MONTELUKAST SODIUM 10 MG/1
10 TABLET ORAL AT BEDTIME
Qty: 30 TABLET | Refills: 0 | Status: SHIPPED | OUTPATIENT
Start: 2022-12-10

## 2022-12-10 NOTE — ADDENDUM NOTE
Addended by: HILARIO GORDON on: 12/10/2022 09:03 AM     Modules accepted: Orders, Level of Service

## 2022-12-10 NOTE — PROGRESS NOTES
HISTORY OF PRESENT ILLNESS  Mr. Oro is a pleasant 22 year old male following up with   Francesco      PHYSICAL EXAM  Physical Exam  HENT:      Mouth/Throat:      Pharynx: Oropharynx is clear.   Eyes:      Conjunctiva/sclera: Conjunctivae normal.   Cardiovascular:      Rate and Rhythm: Normal rate and regular rhythm.      Pulses: Normal pulses.      Heart sounds: Normal heart sounds. No murmur heard.  Pulmonary:      Effort: Pulmonary effort is normal. No respiratory distress.      Breath sounds: Normal breath sounds. No wheezing.   Neurological:      Mental Status: He is alert.           ASSESSMENT & PLAN  Mr. Oro is a 22 year old male following up with    I ordered & reviewed       Francesco should follow up in weeks.  He should follow up sooner if the condition worsens or if other problems arise.    It was a pleasure seeing Francesco.        Prashant Rea DO, CAQSM      This note was constructed using Dragon dictation software, please excuse any minor errors in spelling, grammar, or syntax.

## 2023-02-06 ENCOUNTER — TELEPHONE (OUTPATIENT)
Dept: ORTHOPEDICS | Facility: CLINIC | Age: 23
End: 2023-02-06
Payer: COMMERCIAL

## 2023-02-06 DIAGNOSIS — M54.16 LUMBAR RADICULOPATHY: Primary | ICD-10-CM

## 2023-02-06 PROCEDURE — 99207 PR NO CHARGE LOS: CPT | Performed by: FAMILY MEDICINE

## 2023-02-06 RX ORDER — PREDNISONE 20 MG/1
40 TABLET ORAL DAILY
Qty: 10 TABLET | Refills: 0 | Status: SHIPPED | OUTPATIENT
Start: 2023-02-06 | End: 2023-02-11

## 2023-02-06 NOTE — TELEPHONE ENCOUNTER
Telephone discussion with Giles Sandy ATC.    Francesco has been experiencing worsening radicular pain in his leg lately.  I'm prescribing him prednisone for these symptoms, as this has helped him in the past.    We will follow up if ineffective.    Bari Rea, DO CAQSM

## 2023-02-13 ENCOUNTER — OFFICE VISIT (OUTPATIENT)
Dept: ORTHOPEDICS | Facility: CLINIC | Age: 23
End: 2023-02-13
Payer: COMMERCIAL

## 2023-02-13 DIAGNOSIS — S06.0X0A CONCUSSION WITHOUT LOSS OF CONSCIOUSNESS, INITIAL ENCOUNTER: Primary | ICD-10-CM

## 2023-02-13 NOTE — LETTER
2/13/2023      RE: Francesco Oro  [address] [facility] [address]     Dear Colleague,    Thank you for referring your patient, Francesco Oro, to the [facility]. Please see a copy of my visit note below.    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  Francesco was skating when he was checked, forcing him to slide into the boards with his right shoulder and head and neck area.  This was 2 nights ago.  He felt well at the moment, although did have a mild headache after the game.  He continued to feel somewhat poor after the hit, this continued through the night.  He woke up the following morning and had a severe headache that was followed by an episode of vomiting.  He also experienced some dizziness and nausea.  He was seen at the emergency department where he had a CT done of his head, fortunately this was normal and reassuring.  He feels much better today, he is still experiencing a very mild headache, although otherwise is feeling back to baseline.        Additional history: as documented    MEDICAL HISTORY  There is no problem list on file for this patient.      Current Outpatient Medications   Medication Sig Dispense Refill     beclomethasone HFA (QVAR REDIHALER) 80 MCG/ACT inhaler Inhale 1 puff into the lungs 2 times daily 10.6 g 0     benzonatate (TESSALON) 100 MG capsule Take 1 capsule (100 mg) by mouth 3 times daily as needed for cough 30 capsule 0     fluticasone (FLONASE) 50 MCG/ACT nasal spray Spray 1 spray into both nostrils 2 times daily 9.9 mL 0     meloxicam (MOBIC) 15 MG tablet Take 1 tablet (15 mg) by mouth daily Take daily for 7 days, then daily as needed 30 tablet 0     montelukast (SINGULAIR) 10 MG tablet Take 1 tablet (10 mg) by mouth At Bedtime 30 tablet 0     predniSONE (DELTASONE) 20 MG tablet Take 2 tablets (40 mg) by mouth daily 10 tablet 0        Allergies   Allergen Reactions     Penicillin G      Sulfa Drugs        No family history on file.    Additional medical/Social/Surgical histories reviewed in [facility] and updated as appropriate.        PHYSICAL EXAM    Physical Exam  Exam conducted with a chaperone present.   Constitutional:       Appearance: Normal appearance.   HENT:      Head: Normocephalic and atraumatic.   Eyes:      Extraocular Movements: Extraocular movements intact.      Right eye: Normal extraocular motion and no nystagmus.      Left eye: Normal extraocular motion and no nystagmus.      Conjunctiva/sclera: Conjunctivae normal.      Pupils: Pupils are equal, round, and reactive to light.      Comments: Normal convergence bilaterally   Neurological:      Mental Status: He is alert.      Sensory: Sensation is intact.      Motor: Motor function is intact. No weakness.      Coordination: Coordination is intact. Romberg sign negative. Coordination normal.      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    We had a good discussion centered around pathophysiology and treatment strategies for his concussion.  Given his improvement thus far it would be reasonable in the next couple of days to initiate the return to play protocol, this would be if he continues to remain minimally or asymptomatic throughout the day today and tomorrow morning.  This will be managed by his , Giles Sandy.    If Francesco does well we should follow-up later this week to consider clearance, if he worsens or is otherwise concerned we should follow-up sooner.    It was a pleasure seeing Francesco today.    Prashant Rea DO, [facility]  Primary Care Sports Medicine      This note was constructed using Dragon dictation software, please excuse any minor errors in spelling, grammar, or syntax.        Again, thank you for allowing me to participate in the care of your patient.       Sincerely,    Prashant Rea, DO

## 2023-02-13 NOTE — LETTER
Date:February 15, 2023      Patient was self referred, no letter generated. Do not send.        [facility] Health Information

## 2023-02-14 NOTE — PROGRESS NOTES
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  Francesco was skating when he was checked, forcing him to slide into the boards with his right shoulder and head and neck area.  This was 2 nights ago.  He felt well at the moment, although did have a mild headache after the game.  He continued to feel somewhat poor after the hit, this continued through the night.  He woke up the following morning and had a severe headache that was followed by an episode of vomiting.  He also experienced some dizziness and nausea.  He was seen at the emergency department where he had a CT done of his head, fortunately this was normal and reassuring.  He feels much better today, he is still experiencing a very mild headache, although otherwise is feeling back to baseline.        Additional history: as documented    MEDICAL HISTORY  There is no problem list on file for this patient.      Current Outpatient Medications   Medication Sig Dispense Refill     beclomethasone HFA (QVAR REDIHALER) 80 MCG/ACT inhaler Inhale 1 puff into the lungs 2 times daily 10.6 g 0     benzonatate (TESSALON) 100 MG capsule Take 1 capsule (100 mg) by mouth 3 times daily as needed for cough 30 capsule 0     fluticasone (FLONASE) 50 MCG/ACT nasal spray Spray 1 spray into both nostrils 2 times daily 9.9 mL 0     meloxicam (MOBIC) 15 MG tablet Take 1 tablet (15 mg) by mouth daily Take daily for 7 days, then daily as needed 30 tablet 0     montelukast (SINGULAIR) 10 MG tablet Take 1 tablet (10 mg) by mouth At Bedtime 30 tablet 0     predniSONE (DELTASONE) 20 MG tablet Take 2 tablets (40 mg) by mouth daily 10 tablet 0       Allergies   Allergen Reactions     Penicillin G      Sulfa Drugs        No family history on file.    Additional medical/Social/Surgical histories reviewed in [facility] and updated as appropriate.        PHYSICAL EXAM    Physical Exam  Exam  conducted with a chaperone present.   Constitutional:       Appearance: Normal appearance.   HENT:      Head: Normocephalic and atraumatic.   Eyes:      Extraocular Movements: Extraocular movements intact.      Right eye: Normal extraocular motion and no nystagmus.      Left eye: Normal extraocular motion and no nystagmus.      Conjunctiva/sclera: Conjunctivae normal.      Pupils: Pupils are equal, round, and reactive to light.      Comments: Normal convergence bilaterally   Neurological:      Mental Status: He is alert.      Sensory: Sensation is intact.      Motor: Motor function is intact. No weakness.      Coordination: Coordination is intact. Romberg sign negative. Coordination normal.      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    We had a good discussion centered around pathophysiology and treatment strategies for his concussion.  Given his improvement thus far it would be reasonable in the next couple of days to initiate the return to play protocol, this would be if he continues to remain minimally or asymptomatic throughout the day today and tomorrow morning.  This will be managed by his , Giles Sandy.    If Francesco does well we should follow-up later this week to consider clearance, if he worsens or is otherwise concerned we should follow-up sooner.    It was a pleasure seeing Francesco today.    Prashant Rea DO, CAM  Primary Care Sports Medicine      This note was constructed using Dragon dictation software, please excuse any minor errors in spelling, grammar, or syntax.

## 2023-04-12 ENCOUNTER — TELEPHONE (OUTPATIENT)
Dept: ORTHOPEDICS | Facility: CLINIC | Age: 23
End: 2023-04-12

## 2023-04-12 DIAGNOSIS — G89.29 CHRONIC LEFT-SIDED LOW BACK PAIN WITH SCIATICA, SCIATICA LATERALITY UNSPECIFIED: Primary | ICD-10-CM

## 2023-04-12 DIAGNOSIS — M54.40 CHRONIC LEFT-SIDED LOW BACK PAIN WITH SCIATICA, SCIATICA LATERALITY UNSPECIFIED: Primary | ICD-10-CM

## 2023-04-13 ENCOUNTER — ANCILLARY PROCEDURE (OUTPATIENT)
Dept: MRI IMAGING | Facility: CLINIC | Age: 23
End: 2023-04-13
Attending: ORTHOPAEDIC SURGERY
Payer: COMMERCIAL

## 2023-04-13 DIAGNOSIS — M54.40 CHRONIC LEFT-SIDED LOW BACK PAIN WITH SCIATICA, SCIATICA LATERALITY UNSPECIFIED: ICD-10-CM

## 2023-04-13 DIAGNOSIS — G89.29 CHRONIC LEFT-SIDED LOW BACK PAIN WITH SCIATICA, SCIATICA LATERALITY UNSPECIFIED: ICD-10-CM

## 2023-04-13 PROCEDURE — 72148 MRI LUMBAR SPINE W/O DYE: CPT | Performed by: STUDENT IN AN ORGANIZED HEALTH CARE EDUCATION/TRAINING PROGRAM

## 2023-04-18 ENCOUNTER — TELEPHONE (OUTPATIENT)
Dept: ORTHOPEDICS | Facility: CLINIC | Age: 23
End: 2023-04-18
Payer: COMMERCIAL

## 2023-04-18 DIAGNOSIS — M54.16 LUMBAR RADICULOPATHY: Primary | ICD-10-CM

## 2023-04-18 PROCEDURE — 99207 PR NO CHARGE LOS: CPT | Performed by: FAMILY MEDICINE

## 2023-04-20 ENCOUNTER — TELEPHONE (OUTPATIENT)
Dept: MEDSURG UNIT | Facility: CLINIC | Age: 23
End: 2023-04-20
Payer: COMMERCIAL

## 2023-04-20 NOTE — TELEPHONE ENCOUNTER
S: Telephone visit with Francesco and his , Giles Sandy, regarding back and leg pain.  Throughout the course of the season Francesco has been dealing with back and leg pain that has been worsening.  He feels pain in his left lower extremity that extends down through his posterior thigh.  He does have a history of a L4-5 microdiscectomy and laminectomy that was done in May 2020, he did have a epidural injection subsequent to this I did help him for some time.  This has greatly affected his training throughout the season and is affecting his daily life at times.    O: Per his  through observation, Francesco exhibits a normal gait, he is able to participate in strenuous activities like ice-skating, although these do exacerbate his pain.      A/P: Lumbar radiculopathy with disc herniation.    I reviewed his MRI, this reveals a microlaminectomy defect at L4-5.  Given his ongoing pain I do think it would be reasonable to perform a subsequent injection, he can have this done at his earliest convenience.  We should follow-up after his injection to review the efficacy.    Bari Rea, DO

## 2023-04-24 ENCOUNTER — HOSPITAL ENCOUNTER (OUTPATIENT)
Facility: CLINIC | Age: 23
Discharge: HOME | End: 2023-04-24
Admitting: PHYSICIAN ASSISTANT
Payer: COMMERCIAL

## 2023-04-24 ENCOUNTER — HOSPITAL ENCOUNTER (OUTPATIENT)
Dept: GENERAL RADIOLOGY | Facility: CLINIC | Age: 23
Discharge: HOME | End: 2023-04-24
Attending: FAMILY MEDICINE
Payer: COMMERCIAL

## 2023-04-24 VITALS
DIASTOLIC BLOOD PRESSURE: 80 MMHG | HEART RATE: 76 BPM | RESPIRATION RATE: 16 BRPM | OXYGEN SATURATION: 98 % | SYSTOLIC BLOOD PRESSURE: 122 MMHG

## 2023-04-24 VITALS — SYSTOLIC BLOOD PRESSURE: 106 MMHG | OXYGEN SATURATION: 100 % | HEART RATE: 72 BPM | DIASTOLIC BLOOD PRESSURE: 64 MMHG

## 2023-04-24 DIAGNOSIS — M54.16 LUMBAR RADICULOPATHY: ICD-10-CM

## 2023-04-24 PROCEDURE — 250N000011 HC RX IP 250 OP 636: Performed by: FAMILY MEDICINE

## 2023-04-24 PROCEDURE — 250N000009 HC RX 250: Performed by: FAMILY MEDICINE

## 2023-04-24 PROCEDURE — 62323 NJX INTERLAMINAR LMBR/SAC: CPT

## 2023-04-24 PROCEDURE — 255N000002 HC RX 255 OP 636: Performed by: FAMILY MEDICINE

## 2023-04-24 PROCEDURE — 999N000154 HC STATISTIC RADIOLOGY XRAY, US, CT, MAR, NM

## 2023-04-24 RX ORDER — IOPAMIDOL 408 MG/ML
10 INJECTION, SOLUTION INTRATHECAL ONCE
Status: COMPLETED | OUTPATIENT
Start: 2023-04-24 | End: 2023-04-24

## 2023-04-24 RX ORDER — NICOTINE POLACRILEX 4 MG
15-30 LOZENGE BUCCAL
Status: DISCONTINUED | OUTPATIENT
Start: 2023-04-24 | End: 2023-04-25 | Stop reason: HOSPADM

## 2023-04-24 RX ORDER — BETAMETHASONE SODIUM PHOSPHATE AND BETAMETHASONE ACETATE 3; 3 MG/ML; MG/ML
3 INJECTION, SUSPENSION INTRA-ARTICULAR; INTRALESIONAL; INTRAMUSCULAR; SOFT TISSUE ONCE
Status: COMPLETED | OUTPATIENT
Start: 2023-04-24 | End: 2023-04-24

## 2023-04-24 RX ORDER — DEXTROSE MONOHYDRATE 25 G/50ML
25-50 INJECTION, SOLUTION INTRAVENOUS
Status: DISCONTINUED | OUTPATIENT
Start: 2023-04-24 | End: 2023-04-25 | Stop reason: HOSPADM

## 2023-04-24 RX ADMIN — LIDOCAINE HYDROCHLORIDE 1.5 ML: 10 INJECTION, SOLUTION EPIDURAL; INFILTRATION; INTRACAUDAL; PERINEURAL at 08:33

## 2023-04-24 RX ADMIN — IOPAMIDOL 2 ML: 408 INJECTION, SOLUTION INTRATHECAL at 08:36

## 2023-04-24 RX ADMIN — BETAMETHASONE SODIUM PHOSPHATE AND BETAMETHASONE ACETATE 3 ML: 3; 3 INJECTION, SUSPENSION INTRA-ARTICULAR; INTRALESIONAL; INTRAMUSCULAR at 08:37

## 2023-04-24 RX ADMIN — LIDOCAINE HYDROCHLORIDE 2 ML: 10 INJECTION, SOLUTION EPIDURAL; INFILTRATION; INTRACAUDAL; PERINEURAL at 08:36

## 2023-04-24 ASSESSMENT — ACTIVITIES OF DAILY LIVING (ADL): ADLS_ACUITY_SCORE: 35

## 2023-04-24 NOTE — DISCHARGE INSTRUCTIONS
Steroid Injection Discharge Instructions     After you go home:    You may resume your normal diet.    Care of Puncture Site:    If you have a bandaid on your puncture site, you may remove it the next morning  You may shower tomorrow  No bath tubs, whirlpools or swimming pool for at least 48 hours  Use ice packs as needed for discomfort     Activity:    Minimize your activity today. You may gradually resume your normal activity as tolerated  Avoid vigorous or strenuous activity until your symptoms improve or as directed by your doctor  Do NOT drive a vehicle for a few hours after the injection - or longer if you develop numbness in your arm or leg    Medicines:    You may resume all medications, including blood thinners  For minor discomfort, you may take Acetaminophen (Tylenol) or Ibuprofen (Advil)    Pain:     You may experience increased or different pain over the next 24-48 hours  For the next 48 hrs - you may use ice packs for discomfort     Call your primary care doctor if:    You have severe pain that does not improve with pain medication  You have chills or a fever greater than 101 F (38 C)  The site is red, swollen, hot or tender  Increase in pain, weakness or numbness  New problems with your bowel or bladder  Any questions or concerns    What to watch for:    It can be normal to have some bruising or slight swelling at the puncture site.   After the procedure, you may have some new weakness or numbness down your arm/leg from the numbing medicine. This should resolve in a few hours.   You may feel some temporary relief from the numbing medicine, but that will wear off within a few hours.  Your symptoms may return to pre procedure level, or can even be worse for the first 1-2 days.  For many people, the steroid begins to provide some relief within 2-3 days, but it can take up to 2 weeks to obtain the full results.  Some people will get lasting relief from a single injection. Others may require up to 3  injections to get results. If you have more than one steroid injection, they should be given 2 weeks apart.  If you have no improvement in your symptoms after two weeks, please contact the doctor who ordered this procedure to discuss the next steps.  Side effects of your steroid injection are mild and will go away in 2-3 days  Insomnia  Irritability  Flushed face  Water retention  Restlessness  Difficulty sleeping  Increased appetite  Increased blood sugar  If you are diabetic, monitor your blood sugar closely. Contact the provider who manages your diabetes to help you control your blood sugar if needed.    If you have questions or concerns call:                  [facility] Radiology Dept @ [phone]                                    between 8am-4:30pm Mon-Fri    If you have urgent questions outside of these normal business hours, please contact the [facility] on call doctor @ [phone]      The provider who performed your procedure was Cathy HALE________________.

## 2023-04-24 NOTE — PROCEDURES
[facility]    Procedure: Left L5-S1 TLESI    Date/Time: 4/24/2023 8:40 AM    Performed by: Jacob Holcomb PA-C  Authorized by: Jacob Holcomb PA-C      UNIVERSAL PROTOCOL   Site Marked: Yes  Prior Images Obtained and Reviewed:  Yes  Required items: Required blood products, implants, devices and special equipment available    Patient identity confirmed:  Verbally with patient  Patient was reevaluated immediately before administering moderate or deep sedation or anesthesia  Confirmation Checklist:  Patient's identity using two indicators, relevant allergies, procedure was appropriate and matched the consent or emergent situation and correct equipment/implants were available  Time out: Immediately prior to the procedure a time out was called    Universal Protocol: the Joint Commission Universal Protocol was followed    Preparation: Patient was prepped and draped in usual sterile fashion       ANESTHESIA    Local Anesthetic: Lidocaine 1% without epinephrine      SEDATION    Patient Sedated: No    See dictated procedure note for full details.    PROCEDURE  Describe Procedure: Previous L4-L5 microdiscectomy.  Left back and posterior leg pain more consistent with L5 or S1 radiculopathy.    Chose L5-S1 ILESI using left paracentral approach.  Patient Tolerance:  Patient tolerated the procedure well with no immediate complications  Length of time physician/provider present for 1:1 monitoring during sedation: 0

## 2023-04-24 NOTE — PROGRESS NOTES
[facility] Discharge Nursing Note    Patient Information  Name: Francesco Oro  Age: 23 year old    Discharge Education:  Discharge instructions reviewed: Yes  Additional education/resources provided: yes  Patient/patient representative verbalizes understanding: Yes  Patient discharging on new medications: No  Medication education completed: N/A    Discharge Plans:   Discharge location: home  Discharge ride contacted: Yes  Approximate discharge time: 0930    Discharge Criteria:  Discharge criteria met and vital signs stable: Yes    Patient Belongs:  Patient belongings returned to patient: Yes    Deandra Jones RN

## 2023-04-24 NOTE — PROGRESS NOTES
[facility] Post Procedure Note    Patient Information  Name: Francesco Oro  Age: 23 year old    Post Procedure  Time patient returned to Care [facility]: 0840  Concerns/abnormal assessment: none,lumbar spine bandaid clean and dry  If abnormal assessment, provider notified: N/A  Plan/Other: discharge at 0900    Deandra Jones RN

## 2023-05-25 ENCOUNTER — OFFICE VISIT (OUTPATIENT)
Dept: FAMILY MEDICINE | Facility: CLINIC | Age: 23
End: 2023-05-25
Payer: COMMERCIAL

## 2023-05-25 VITALS
TEMPERATURE: 98.6 F | HEIGHT: 72 IN | HEART RATE: 65 BPM | DIASTOLIC BLOOD PRESSURE: 75 MMHG | BODY MASS INDEX: 24.38 KG/M2 | WEIGHT: 180 LBS | SYSTOLIC BLOOD PRESSURE: 121 MMHG

## 2023-05-25 DIAGNOSIS — S16.1XXA NECK STRAIN, INITIAL ENCOUNTER: Primary | ICD-10-CM

## 2023-05-25 RX ORDER — METHYLPREDNISOLONE 4 MG
TABLET, DOSE PACK ORAL
Qty: 21 TABLET | Refills: 0 | Status: SHIPPED | OUTPATIENT
Start: 2023-05-25

## 2023-05-25 ASSESSMENT — ENCOUNTER SYMPTOMS
SEIZURES: 0
FOCAL WEAKNESS: 0
NECK PAIN: 1
TINGLING: 0
SENSORY CHANGE: 0
DIZZINESS: 0
WEAKNESS: 0
TREMORS: 0
HEADACHES: 1
MYALGIAS: 1
BACK PAIN: 0
PSYCHIATRIC NEGATIVE: 1
FALLS: 0
LOSS OF CONSCIOUSNESS: 0
RESPIRATORY NEGATIVE: 1
SPEECH CHANGE: 0
CARDIOVASCULAR NEGATIVE: 1
CONSTITUTIONAL NEGATIVE: 1

## 2023-05-25 NOTE — LETTER
5/25/2023      RE: Francesco Oro  [address]  [facility] [address]     Dear Colleague,    Thank you for referring your patient, Francesco Oro, to the [facility]. Please see a copy of my visit note below.    HPI  IN with B neck soreness after a lift a few days ago.  Got better but returned with further activity.  Hurts on sides of neck then if active can run up to posterior scalp.  Gets HA with it.  No restriction in ROM.  No radicular sxs into arms.  NO weakness.  No midlinne pain    Review of Systems   Constitutional: Negative.    HENT: Negative.    Respiratory: Negative.    Cardiovascular: Negative.    Musculoskeletal: Positive for myalgias and neck pain. Negative for back pain, falls and joint pain.   Neurological: Positive for headaches. Negative for dizziness, tingling, tremors, sensory change, speech change, focal weakness, seizures, loss of consciousness and weakness.   Psychiatric/Behavioral: Negative.      Lumbar disc issues in the past, this does not feel similar.    Physical Exam  Vitals reviewed.   Musculoskeletal:         General: No swelling, tenderness, deformity or signs of injury. Normal range of motion.      Right lower leg: No edema.      Left lower leg: No edema.      Comments: Neck with FROM and normal strength.  Neg TTP  Neg spurlungs   Neurological:      General: No focal deficit present.      Mental Status: He is oriented to person, place, and time.      Cranial Nerves: No cranial nerve deficit.      Sensory: No sensory deficit.      Motor: No weakness.      Coordination: Coordination normal.      Gait: Gait normal.      Deep Tendon Reflexes: Reflexes normal.   Psychiatric:         Mood and Affect: Mood normal.         Thought Content: Thought content normal.           Neck strain/muscle spasm  -no radicular symptoms or anything on exam to suggest serious issues at this time  -trial of dosepk  -if not improving will get imaging to further assess      Again,  thank you for allowing me to participate in the care of your patient.      Sincerely,    Prashant Boo MD

## 2023-05-25 NOTE — PROGRESS NOTES
HPI  IN with B neck soreness after a lift a few days ago.  Got better but returned with further activity.  Hurts on sides of neck then if active can run up to posterior scalp.  Gets HA with it.  No restriction in ROM.  No radicular sxs into arms.  NO weakness.  No midlinne pain    Review of Systems   Constitutional: Negative.    HENT: Negative.    Respiratory: Negative.    Cardiovascular: Negative.    Musculoskeletal: Positive for myalgias and neck pain. Negative for back pain, falls and joint pain.   Neurological: Positive for headaches. Negative for dizziness, tingling, tremors, sensory change, speech change, focal weakness, seizures, loss of consciousness and weakness.   Psychiatric/Behavioral: Negative.      Lumbar disc issues in the past, this does not feel similar.    Physical Exam  Vitals reviewed.   Musculoskeletal:         General: No swelling, tenderness, deformity or signs of injury. Normal range of motion.      Right lower leg: No edema.      Left lower leg: No edema.      Comments: Neck with FROM and normal strength.  Neg TTP  Neg spurlungs   Neurological:      General: No focal deficit present.      Mental Status: He is oriented to person, place, and time.      Cranial Nerves: No cranial nerve deficit.      Sensory: No sensory deficit.      Motor: No weakness.      Coordination: Coordination normal.      Gait: Gait normal.      Deep Tendon Reflexes: Reflexes normal.   Psychiatric:         Mood and Affect: Mood normal.         Thought Content: Thought content normal.           Neck strain/muscle spasm  -no radicular symptoms or anything on exam to suggest serious issues at this time  -trial of dosepk  -if not improving will get imaging to further assess

## 2023-10-02 ENCOUNTER — ANCILLARY PROCEDURE (OUTPATIENT)
Dept: GENERAL RADIOLOGY | Facility: CLINIC | Age: 23
End: 2023-10-02
Attending: ORTHOPAEDIC SURGERY
Payer: COMMERCIAL

## 2023-10-02 DIAGNOSIS — S43.50XA ACROMIOCLAVICULAR SPRAIN: ICD-10-CM

## 2023-10-02 PROCEDURE — 73030 X-RAY EXAM OF SHOULDER: CPT | Mod: RT | Performed by: RADIOLOGY

## 2023-10-08 ENCOUNTER — OFFICE VISIT (OUTPATIENT)
Dept: ORTHOPEDICS | Facility: CLINIC | Age: 23
End: 2023-10-08
Payer: COMMERCIAL

## 2023-10-08 DIAGNOSIS — S43.51XA SPRAIN OF RIGHT ACROMIOCLAVICULAR LIGAMENT, INITIAL ENCOUNTER: Primary | ICD-10-CM

## 2023-10-08 NOTE — LETTER
10/8/2023      RE: Francesco Oro  [address]  [place] [address]     Dear Colleague,    Thank you for referring your patient, Francesco Oro, to the [facility]. Please see a copy of my visit note below.    Chief Complaint: Right shoulder injury    Sport:  Hockey    History of Present Illness:  Francesco is a 23-year-old  who injured his right shoulder 1 week ago.  His shoulder is improving.  He is doing treatments with his .    Physical Examination:  23-year-old male alert oriented no apparent distress.  Cervical spine full range of motion, nontender, negative Spurling's.  No significant deformity at the right AC joint.  Very minimal swelling.  Slight tenderness at the AC joint.  Nontender over the cuff and nontender over the biceps.  Full and symmetrical range of motion.  5/5 rotator cuff strength.  Minimal discomfort with the crossarm maneuver.    Imaging:  I reviewed the patient's radiographs and radiographic report.  No fracture.  Very minimal widening of the AC joint.    Impression:  23-year-old  with a right shoulder grade 1-2 AC injury.  We had a long conversation about this injury.  I think he is making very good progress.  I see nothing that would make us entertaining surgical treatment.  We did discuss the possibility of an injection for next week and is again if his symptoms persist a bit too much.  Questions were answered.    Plan:  Continue rehabilitation with his   Advance his hockey activity  Possibly consider an AC joint injection at game time next weekend.  20 minutes was spent on the date of the encounter doing chart review, patient visit, and documentation     Again, thank you for allowing me to participate in the care of your patient.      Sincerely,    Kali Rubalcava MD

## 2023-10-14 ENCOUNTER — OFFICE VISIT (OUTPATIENT)
Dept: ORTHOPEDICS | Facility: CLINIC | Age: 23
End: 2023-10-14
Payer: COMMERCIAL

## 2023-10-14 ENCOUNTER — OFFICE VISIT (OUTPATIENT)
Dept: ORTHOPEDICS | Facility: CLINIC | Age: 23
End: 2023-10-14

## 2023-10-14 DIAGNOSIS — S43.51XA ACROMIOCLAVICULAR SPRAIN, RIGHT, INITIAL ENCOUNTER: Primary | ICD-10-CM

## 2023-10-14 NOTE — PROGRESS NOTES
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  He has previously seen my colleague, Dr. Rubalcava.  He has been diagnosed with a right grade 1 AC sprain.  X-ray imaging has confirmed no fracture or acute bony pathology.  He is still struggling with pain limiting his ability to play his collegiate sport.  He denies any weakness in the arm.  No numbness or tingling in the hand.    Objective:   PE:  RUE: No pain to palpation of the clavicle shaft.  Pain to palpation of the AC joint.  No pain to palpation over the acromion.  Full active and passive range of motion of the shoulder.  5 out of 5 strength in flexion, abduction, internal and external rotation.  Positive cross body for pain at the AC joint.    Imaging:   3 view x-ray of the right shoulder from October 2 were independently reviewed by myself.  This shows no fracture or acute bony pathology.  No severe migration of the distal end of the clavicle at the AC joint.  Well-maintained glenohumeral space.  No signs of subluxation or dislocation of the glenohumeral joint.    Procedure:   I discussed the risks and benefits of an intra-articular AC joint local acetic injection with him today.  After discussing the risk and benefits including but not limited to bleeding, infection, and failure to cure pain, posttraumatic arthritis, I obtained verbal consent from the athlete.  The AC joint was identified by palpation of bony landmarks.  The skin was prepped with chlorhexidine.  A final timeout was performed with all in the room in agreement the correct patient, site, procedure, laterality, and surgeon.  A 4cc solution fo 2cc 1% lidocaine (Batch #[id number], Ex: 10/25) and 0.5% Bupivicaine (Batch: [id number], Ex: 3/25) was injected in its entirety into the right AC joint through a 21-gauge needle, using sterile technique.  Sterile dressings were applied.  The patient tolerated the procedure without difficulty.    A/P:  Patient is a  23-year-old male collegiate  with a grade 1 right AC injury that remains symptomatic and limiting his ability to place collegiate sport.  We discussed the risk and benefits of an intra-articular local anesthetic injection prior to his game.  I discussed the risk and benefits he would like to go through with the procedure.  This was performed as detailed above.  He is able to return to sport at full participation and without restriction.  We will continue to monitor his recovery from his injury.    Himanshu Medina  Orthopedic Surgery

## 2023-10-14 NOTE — LETTER
10/14/2023         RE: Francesco Oro  [address]  [facility] [address]        Dear Colleague,    Thank you for referring your patient, Francesco Oro, to the [facility] [place]. Please see a copy of my visit note below.    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  He has previously seen my colleague, Dr. Rubalcava.  He has been diagnosed with a right grade 1 AC sprain.  X-ray imaging has confirmed no fracture or acute bony pathology.  He is still struggling with pain limiting his ability to play his collegiate sport.  He denies any weakness in the arm.  No numbness or tingling in the hand.    Objective:   PE:  RUE: No pain to palpation of the clavicle shaft.  Pain to palpation of the AC joint.  No pain to palpation over the acromion.  Full active and passive range of motion of the shoulder.  5 out of 5 strength in flexion, abduction, internal and external rotation.  Positive cross body for pain at the AC joint.    Imaging:   3 view x-ray of the right shoulder from October 2 were independently reviewed by myself.  This shows no fracture or acute bony pathology.  No severe migration of the distal end of the clavicle at the AC joint.  Well-maintained glenohumeral space.  No signs of subluxation or dislocation of the glenohumeral joint.    Procedure:   I discussed the risks and benefits of an intra-articular AC joint local acetic injection with him today.  After discussing the risk and benefits including but not limited to bleeding, infection, and failure to cure pain, posttraumatic arthritis, I obtained verbal consent from the athlete.  The AC joint was identified by palpation of bony landmarks.  The skin was prepped with chlorhexidine.  A final timeout was performed with all in the room in agreement the correct patient, site, procedure, laterality, and surgeon.  A 4cc solution fo 2cc 1% lidocaine (Batch #[id number], Ex: 10/25)  and 0.5% Bupivicaine (Batch: [id number], Ex: 3/25) was injected in its entirety into the right AC joint through a 21-gauge needle, using sterile technique.  Sterile dressings were applied.  The patient tolerated the procedure without difficulty.    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  We discussed the risk and benefits of an intra-articular local anesthetic injection prior to his game.  I discussed the risk and benefits he would like to go through with the procedure.  This was performed as detailed above.  He is able to return to sport at full participation and without restriction.  We will continue to monitor his recovery from his injury.    Himanshu Medina  Orthopedic Surgery       Again, thank you for allowing me to participate in the care of your patient.        Sincerely,        Himanshu Medina MD

## 2023-10-14 NOTE — LETTER
10/14/2023         RE: Francesco Oro  [address]  [facility] [address]        Dear Colleague,    Thank you for referring your patient, Francesco Oro, to the [facility] [place]. Please see a copy of my visit note below.    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 He tolerated this well and wishes to repeat again tonight.     Objective:   PE:  RUE: No pain to palpation of the clavicle shaft.  Pain to palpation of the AC joint.  No pain to palpation over the acromion.  Full active and passive range of motion of the shoulder.  5 out of 5 strength in flexion, abduction, internal and external rotation.  Positive cross body for pain at the AC joint.     Imaging:   3 view x-ray of the right shoulder from October 2 were independently reviewed by myself.  This shows no fracture or acute bony pathology.  No severe migration of the distal end of the clavicle at the AC joint.  Well-maintained glenohumeral space.  No signs of subluxation or dislocation of the glenohumeral joint.     Procedure:   I discussed the risks and benefits of an intra-articular AC joint local acetic injection with him today.  After discussing the risk and benefits including but not limited to bleeding, infection, and failure to cure pain, posttraumatic arthritis, I obtained verbal consent from the athlete.  The AC joint was identified by palpation of bony landmarks.  The skin was prepped with chlorhexidine.  A final timeout was performed with all in the room in agreement the correct patient, site, procedure, laterality, and surgeon.  A 4cc solution fo 2cc 1% lidocaine (Batch #[id number], Ex: 10/25) and 0.5% Bupivicaine (Batch: [id number], Ex: 3/25) was injected in its entirety into the right AC joint  through a 21-gauge needle, using sterile technique.  Sterile dressings were applied.  The patient tolerated the procedure without difficulty.     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  We discussed the risk and benefits of an intra-articular local anesthetic injection prior to his game.  I discussed the risk and benefits he would like to go through with the procedure.  This was performed as detailed above.  He is able to return to sport at full participation and without restriction.  We will continue to monitor his recovery from his injury.     Himanshu Medina  Orthopedic Surgery       Again, thank you for allowing me to participate in the care of your patient.        Sincerely,        Himanshu Medina MD

## 2023-10-15 NOTE — PROGRESS NOTES
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 He tolerated this well and wishes to repeat again tonight.     Objective:   PE:  RUE: No pain to palpation of the clavicle shaft.  Pain to palpation of the AC joint.  No pain to palpation over the acromion.  Full active and passive range of motion of the shoulder.  5 out of 5 strength in flexion, abduction, internal and external rotation.  Positive cross body for pain at the AC joint.     Imaging:   3 view x-ray of the right shoulder from October 2 were independently reviewed by myself.  This shows no fracture or acute bony pathology.  No severe migration of the distal end of the clavicle at the AC joint.  Well-maintained glenohumeral space.  No signs of subluxation or dislocation of the glenohumeral joint.     Procedure:   I discussed the risks and benefits of an intra-articular AC joint local acetic injection with him today.  After discussing the risk and benefits including but not limited to bleeding, infection, and failure to cure pain, posttraumatic arthritis, I obtained verbal consent from the athlete.  The AC joint was identified by palpation of bony landmarks.  The skin was prepped with chlorhexidine.  A final timeout was performed with all in the room in agreement the correct patient, site, procedure, laterality, and surgeon.  A 4cc solution fo 2cc 1% lidocaine (Batch #[id number], Ex: 10/25) and 0.5% Bupivicaine (Batch: [id number], Ex: 3/25) was injected in its entirety into the right AC joint through a 21-gauge needle, using sterile technique.  Sterile dressings were applied.  The patient tolerated the procedure without difficulty.     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  We discussed the risk and benefits of an intra-articular local anesthetic injection prior to his game.  I discussed the risk and benefits he would like to go through with the procedure.  This was performed as detailed above.  He is able to return to sport at full participation and without restriction.  We will continue to monitor his recovery from his injury.     Himanshu Medina  Orthopedic Surgery

## 2023-10-16 NOTE — PROGRESS NOTES
Chief Complaint: Right shoulder injury    Sport:  Hockey    History of Present Illness:  Francesco is a 23-year-old  who injured his right shoulder 1 week ago.  His shoulder is improving.  He is doing treatments with his .    Physical Examination:  23-year-old male alert oriented no apparent distress.  Cervical spine full range of motion, nontender, negative Spurling's.  No significant deformity at the right AC joint.  Very minimal swelling.  Slight tenderness at the AC joint.  Nontender over the cuff and nontender over the biceps.  Full and symmetrical range of motion.  5/5 rotator cuff strength.  Minimal discomfort with the crossarm maneuver.    Imaging:  I reviewed the patient's radiographs and radiographic report.  No fracture.  Very minimal widening of the AC joint.    Impression:  23-year-old  with a right shoulder grade 1-2 AC injury.  We had a long conversation about this injury.  I think he is making very good progress.  I see nothing that would make us entertaining surgical treatment.  We did discuss the possibility of an injection for next week and is again if his symptoms persist a bit too much.  Questions were answered.    Plan:  Continue rehabilitation with his   Advance his hockey activity  Possibly consider an AC joint injection at game time next weekend.  20 minutes was spent on the date of the encounter doing chart review, patient visit, and documentation

## 2023-10-23 DIAGNOSIS — J45.41 MODERATE PERSISTENT ASTHMA WITH ACUTE EXACERBATION: ICD-10-CM

## 2023-10-23 RX ORDER — BECLOMETHASONE DIPROPIONATE HFA 80 UG/1
1 AEROSOL, METERED RESPIRATORY (INHALATION) 2 TIMES DAILY
Qty: 10.6 G | Refills: 3 | Status: SHIPPED | OUTPATIENT
Start: 2023-10-23 | End: 2023-11-07

## 2023-10-23 NOTE — PROGRESS NOTES
Francesco has a history of reactive airway disease.  He uses his Qvar inhaler when he experiences a mild illness.  He has been experiencing symptoms consistent with an upper respiratory infection, I am refilling this for him now with plans to follow-up at his hockey game this coming Thursday.    Bari Rea, DO CAQSM

## 2023-11-07 ENCOUNTER — OFFICE VISIT (OUTPATIENT)
Dept: ORTHOPEDICS | Facility: CLINIC | Age: 23
End: 2023-11-07
Payer: COMMERCIAL

## 2023-11-07 VITALS — DIASTOLIC BLOOD PRESSURE: 64 MMHG | HEART RATE: 74 BPM | TEMPERATURE: 98 F | SYSTOLIC BLOOD PRESSURE: 113 MMHG

## 2023-11-07 DIAGNOSIS — J45.41 MODERATE PERSISTENT ASTHMA WITH ACUTE EXACERBATION: Primary | ICD-10-CM

## 2023-11-07 DIAGNOSIS — J20.9 ACUTE BRONCHITIS, UNSPECIFIED ORGANISM: ICD-10-CM

## 2023-11-07 PROCEDURE — 99214 OFFICE O/P EST MOD 30 MIN: CPT | Performed by: FAMILY MEDICINE

## 2023-11-07 RX ORDER — FLUTICASONE PROPIONATE AND SALMETEROL 250; 50 UG/1; UG/1
1 POWDER RESPIRATORY (INHALATION) EVERY 12 HOURS
Qty: 1 EACH | Refills: 3 | Status: SHIPPED | OUTPATIENT
Start: 2023-11-07

## 2023-11-07 RX ORDER — AZITHROMYCIN 250 MG/1
TABLET, FILM COATED ORAL
Qty: 6 TABLET | Refills: 0 | Status: SHIPPED | OUTPATIENT
Start: 2023-11-07 | End: 2023-11-12

## 2023-11-07 RX ORDER — PREDNISONE 20 MG/1
40 TABLET ORAL DAILY
Qty: 10 TABLET | Refills: 0 | Status: SHIPPED | OUTPATIENT
Start: 2023-11-07 | End: 2023-11-12

## 2023-11-07 NOTE — PROGRESS NOTES
HISTORY OF PRESENT ILLNESS  Mr. Oro is a pleasant 23 year old male who presents to clinic today with a cough.  Francesco has been feeling under the weather for the past couple of weeks.  This has gotten much worse over the past 3 to 5 days, he mostly complains of a cough and upper respiratory congestion.  He does have some facial pressure, a sore throat, and has felt a subjective fever.  His cough does get worse in the cold weather and with exercise of any kind, although it is fairly frequent at baseline.        Additional history: as documented    MEDICAL HISTORY  There is no problem list on file for this patient.      Current Outpatient Medications   Medication Sig Dispense Refill    azithromycin (ZITHROMAX) 250 MG tablet Take 2 tablets (500 mg) by mouth daily for 1 day, THEN 1 tablet (250 mg) daily for 4 days. 6 tablet 0    benzonatate (TESSALON) 100 MG capsule Take 1 capsule (100 mg) by mouth 3 times daily as needed for cough 30 capsule 0    fluticasone (FLONASE) 50 MCG/ACT nasal spray Spray 1 spray into both nostrils 2 times daily 9.9 mL 0    fluticasone-salmeterol (ADVAIR) 250-50 MCG/ACT inhaler Inhale 1 puff into the lungs every 12 hours 1 each 3    meloxicam (MOBIC) 15 MG tablet Take 1 tablet (15 mg) by mouth daily Take daily for 7 days, then daily as needed 30 tablet 0    methylPREDNISolone (MEDROL DOSEPAK) 4 MG tablet therapy pack Take as directed 21 tablet 0    montelukast (SINGULAIR) 10 MG tablet Take 1 tablet (10 mg) by mouth At Bedtime 30 tablet 0    predniSONE (DELTASONE) 20 MG tablet Take 2 tablets (40 mg) by mouth daily for 5 days 10 tablet 0    predniSONE (DELTASONE) 20 MG tablet Take 2 tablets (40 mg) by mouth daily 10 tablet 0       Allergies   Allergen Reactions    Penicillin G     Sulfa Antibiotics        No family history on file.    Additional medical/Social/Surgical histories reviewed in EPIC and updated as appropriate.        PHYSICAL EXAM  Physical Exam  Vitals reviewed.   Constitutional:        Appearance: Normal appearance.   HENT:      Nose: Congestion present.      Mouth/Throat:      Pharynx: Oropharynx is clear. Posterior oropharyngeal erythema present.   Eyes:      Extraocular Movements: Extraocular movements intact.      Conjunctiva/sclera: Conjunctivae normal.      Pupils: Pupils are equal, round, and reactive to light.   Cardiovascular:      Rate and Rhythm: Normal rate and regular rhythm.      Pulses: Normal pulses.      Heart sounds: Normal heart sounds. No murmur heard.  Pulmonary:      Breath sounds: Examination of the right-upper field reveals decreased breath sounds. Examination of the left-upper field reveals decreased breath sounds. Examination of the right-middle field reveals decreased breath sounds. Examination of the left-middle field reveals decreased breath sounds. Examination of the right-lower field reveals decreased breath sounds. Examination of the left-lower field reveals decreased breath sounds. Decreased breath sounds present.   Neurological:      Mental Status: He is alert.                  ASSESSMENT & PLAN  Mr. Oro is a 23 year old male who presents to clinic today with an upper respiratory illness.  Francesco does have asthma, which does seem to be exacerbated by his current illness as well.    I am prescribing him prednisone as well as azithromycin.  In addition to this I am changing up his Qvar inhaler and replacing this with Advair.  I am also going to communicate with his  that although his albuterol inhaler is not helping him it may be reasonable to attempt a nebulizer treatment, as he has had relief with this in the past.  This is especially before skating, should he feel well enough to try this.    If he is not feeling better whatsoever over the course of the next 4 to 5 days I would likely recommend a chest x-ray and could potentially switch antibiotics.    It was a pleasure seeing Francesco today.    Prashant Rea DO, CAM  Primary [facility] Sports  Medicine      This note was constructed using Dragon dictation software, please excuse any minor errors in spelling, grammar, or syntax.

## 2023-11-07 NOTE — LETTER
11/7/2023      RE: Francesco Oro  [address]  [facility] [address]     Dear Colleague,    Thank you for referring your patient, Francesco Oro, to the [facility] [place]. Please see a copy of my visit note below.      HISTORY OF PRESENT ILLNESS  Mr. Oro is a pleasant 23 year old male who presents to clinic today with a cough.  Francesco has been feeling under the weather for the past couple of weeks.  This has gotten much worse over the past 3 to 5 days, he mostly complains of a cough and upper respiratory congestion.  He does have some facial pressure, a sore throat, and has felt a subjective fever.  His cough does get worse in the cold weather and with exercise of any kind, although it is fairly frequent at baseline.        Additional history: as documented    MEDICAL HISTORY  There is no problem list on file for this patient.      Current Outpatient Medications   Medication Sig Dispense Refill     azithromycin (ZITHROMAX) 250 MG tablet Take 2 tablets (500 mg) by mouth daily for 1 day, THEN 1 tablet (250 mg) daily for 4 days. 6 tablet 0     benzonatate (TESSALON) 100 MG capsule Take 1 capsule (100 mg) by mouth 3 times daily as needed for cough 30 capsule 0     fluticasone (FLONASE) 50 MCG/ACT nasal spray Spray 1 spray into both nostrils 2 times daily 9.9 mL 0     fluticasone-salmeterol (ADVAIR) 250-50 MCG/ACT inhaler Inhale 1 puff into the lungs every 12 hours 1 each 3     meloxicam (MOBIC) 15 MG tablet Take 1 tablet (15 mg) by mouth daily Take daily for 7 days, then daily as needed 30 tablet 0     methylPREDNISolone (MEDROL DOSEPAK) 4 MG tablet therapy pack Take as directed 21 tablet 0     montelukast (SINGULAIR) 10 MG tablet Take 1 tablet (10 mg) by mouth At Bedtime 30 tablet 0     predniSONE (DELTASONE) 20 MG tablet Take 2 tablets (40 mg) by mouth daily for 5 days 10 tablet 0     predniSONE (DELTASONE) 20 MG tablet Take 2 tablets (40 mg) by mouth daily 10 tablet  0       Allergies   Allergen Reactions     Penicillin G      Sulfa Antibiotics        No family history on file.    Additional medical/Social/Surgical histories reviewed in [facility] and updated as appropriate.        PHYSICAL EXAM  Physical Exam  Vitals reviewed.   Constitutional:       Appearance: Normal appearance.   HENT:      Nose: Congestion present.      Mouth/Throat:      Pharynx: Oropharynx is clear. Posterior oropharyngeal erythema present.   Eyes:      Extraocular Movements: Extraocular movements intact.      Conjunctiva/sclera: Conjunctivae normal.      Pupils: Pupils are equal, round, and reactive to light.   Cardiovascular:      Rate and Rhythm: Normal rate and regular rhythm.      Pulses: Normal pulses.      Heart sounds: Normal heart sounds. No murmur heard.  Pulmonary:      Breath sounds: Examination of the right-upper field reveals decreased breath sounds. Examination of the left-upper field reveals decreased breath sounds. Examination of the right-middle field reveals decreased breath sounds. Examination of the left-middle field reveals decreased breath sounds. Examination of the right-lower field reveals decreased breath sounds. Examination of the left-lower field reveals decreased breath sounds. Decreased breath sounds present.   Neurological:      Mental Status: He is alert.             {Diagnostic Test Results (Optional):831659}     ASSESSMENT & PLAN  Mr. Oro is a 23 year old male who presents to clinic today with an upper respiratory illness.  Francesco does have asthma, which does seem to be exacerbated by his current illness as well.    I am prescribing him prednisone as well as azithromycin.  In addition to this I am changing up his Qvar inhaler and replacing this with Advair.  I am also going to communicate with his  that although his albuterol inhaler is not helping him it may be reasonable to attempt a nebulizer treatment, as he has had relief with this in the past.  This is  especially before skating, should he feel well enough to try this.    If he is not feeling better whatsoever over the course of the next 4 to 5 days I would likely recommend a chest x-ray and could potentially switch antibiotics.    It was a pleasure seeing Francesco today.    Prashant Rea DO, [facility]  Primary Care Sports Medicine      This note was constructed using Dragon dictation software, please excuse any minor errors in spelling, grammar, or syntax.      Again, thank you for allowing me to participate in the care of your patient.      Sincerely,    Prashant Rea DO

## 2023-11-15 ENCOUNTER — TELEPHONE (OUTPATIENT)
Dept: ORTHOPEDICS | Facility: CLINIC | Age: 23
End: 2023-11-15

## 2023-11-15 ENCOUNTER — ANCILLARY PROCEDURE (OUTPATIENT)
Dept: GENERAL RADIOLOGY | Facility: CLINIC | Age: 23
End: 2023-11-15
Attending: ORTHOPAEDIC SURGERY
Payer: COMMERCIAL

## 2023-11-15 DIAGNOSIS — M25.511 PAIN IN JOINT OF RIGHT SHOULDER: Primary | ICD-10-CM

## 2023-11-15 DIAGNOSIS — M25.511 PAIN IN JOINT OF RIGHT SHOULDER: ICD-10-CM

## 2023-11-15 PROCEDURE — 73030 X-RAY EXAM OF SHOULDER: CPT | Mod: RT | Performed by: RADIOLOGY

## 2023-11-17 ENCOUNTER — OFFICE VISIT (OUTPATIENT)
Dept: ORTHOPEDICS | Facility: CLINIC | Age: 23
End: 2023-11-17
Payer: COMMERCIAL

## 2023-11-17 DIAGNOSIS — M25.511 PAIN IN JOINT OF RIGHT SHOULDER: Primary | ICD-10-CM

## 2023-11-17 NOTE — LETTER
11/17/2023      RE: Francesco Oro  [address]  [facility] [address]     Dear Colleague,    Thank you for referring your patient, Francesco Oro, to the [facility]. Please see a copy of my visit note below.    Chief Complaint: Right shoulder injury    Sport:  Hockey    History of Present Illness:  Francesco is a 23-year-old  who sustained a slight reinjury of his right AC joint.  He is feeling better.  Denies neck pain.  Denies numbness or tingling.  Denies a sensation of shoulder instability.  His pain is on the superior aspect of his shoulder.    Physical Examination:  Cervical spine nontender full range of motion.  Full and symmetrical shoulder range of motion.  No deformity at the right AC joint.  Tender at the right AC joint.  Slightly tender with the crossarm maneuver.  Rotator cuff strength is 5/5.    Imaging:  I reviewed the patient's radiographs and there is no evidence of fracture.  There are some slight widening of the AC joint.    Impression:  Reaggravation of a right AC joint sprain.  Jena and I had a nice conversation tonight about this injury.  I do not think he is done any significant increased damage to his joint.  We discussed treatment options including rehabilitation.  He is requesting an AC joint injection.  This is worked for him in the past to help with discomfort for game play.  We discussed the risk such as reaction or infection.  He understands and would like to proceed    Procedure note: After explanation of risks and benefits, verbal consent was obtained.  The right shoulder was sterilely prepped.  2 cc of lidocaine and 2 cc of Marcaine were injected into the right AC joint without issue.    Plan:  We will allow him to participate tonight.  Precautions were given.  We will discuss the efficacy of the injection and whether we need to do this for Saturday nights game.  20 minutes was spent on the date of the encounter doing chart review, patient  visit, and documentation     Again, thank you for allowing me to participate in the care of your patient.      Sincerely,    Kali Rubalcava MD

## 2023-11-18 ENCOUNTER — OFFICE VISIT (OUTPATIENT)
Dept: ORTHOPEDICS | Facility: CLINIC | Age: 23
End: 2023-11-18
Payer: COMMERCIAL

## 2023-11-18 DIAGNOSIS — M25.511 PAIN IN JOINT OF RIGHT SHOULDER: Primary | ICD-10-CM

## 2023-11-18 NOTE — PROGRESS NOTES
Chief Complaint: Right shoulder injury    Sport:  Hockey    History of Present Illness:  Francesco is a 23-year-old  who sustained a slight reinjury of his right AC joint.  He is feeling better.  Denies neck pain.  Denies numbness or tingling.  Denies a sensation of shoulder instability.  His pain is on the superior aspect of his shoulder.    Physical Examination:  Cervical spine nontender full range of motion.  Full and symmetrical shoulder range of motion.  No deformity at the right AC joint.  Tender at the right AC joint.  Slightly tender with the crossarm maneuver.  Rotator cuff strength is 5/5.    Imaging:  I reviewed the patient's radiographs and there is no evidence of fracture.  There are some slight widening of the AC joint.    Impression:  Reaggravation of a right AC joint sprain.  Jena and I had a nice conversation tonight about this injury.  I do not think he is done any significant increased damage to his joint.  We discussed treatment options including rehabilitation.  He is requesting an AC joint injection.  This is worked for him in the past to help with discomfort for game play.  We discussed the risk such as reaction or infection.  He understands and would like to proceed    Procedure note: After explanation of risks and benefits, verbal consent was obtained.  The right shoulder was sterilely prepped.  2 cc of lidocaine and 2 cc of Marcaine were injected into the right AC joint without issue.    Plan:  We will allow him to participate tonight.  Precautions were given.  We will discuss the efficacy of the injection and whether we need to do this for Saturday nights game.  20 minutes was spent on the date of the encounter doing chart review, patient visit, and documentation

## 2023-11-18 NOTE — LETTER
11/18/2023      RE: Francesco Oro  [address]  [facility] [address]     Dear Colleague,    Thank you for referring your patient, Francesco Oro, to the [facility] CL[facility]. Please see a copy of my visit note below.    Chief Complaint: Right shoulder pain    Sport:  Hockey    History of Present Illness:  Francesco is a 23-year-old  who presents in training room before the game for a repeat right shoulder AC joint injection.  He had some relief with the injection last night.  He feels that his pain is a bit more lateral.  This may be more over the muscle insertion onto the top of his acromion.  He would like to proceed with another injection.    Physical Examination:  Tender at the AC joint.  Tender more laterally.    Impression:  23-year-old  with grade 1-2 AC joint injury.  In training room tonight to discuss a repeat injection.  The risks were explained.    Procedure note: After explanation of risks and benefits, verbal consent was obtained.  The right shoulder was sterilely prepped.  3 cc of lidocaine and 3 cc of Marcaine were injected both in the AC joint as well as the distal clavicle and acromion periosteum.    Plan:  Patient will continue rehabilitation with his .  We will reassess him to at the end of next week to see if an injection is required for next week's game against [place] [employer].    Again, thank you for allowing me to participate in the care of your patient.      Sincerely,    Kali Rubalcava MD

## 2023-11-19 NOTE — PROGRESS NOTES
Chief Complaint: Right shoulder pain    Sport:  Hockey    History of Present Illness:  Francesco is a 23-year-old  who presents in training room before the game for a repeat right shoulder AC joint injection.  He had some relief with the injection last night.  He feels that his pain is a bit more lateral.  This may be more over the muscle insertion onto the top of his acromion.  He would like to proceed with another injection.    Physical Examination:  Tender at the AC joint.  Tender more laterally.    Impression:  23-year-old  with grade 1-2 AC joint injury.  In training room tonight to discuss a repeat injection.  The risks were explained.    Procedure note: After explanation of risks and benefits, verbal consent was obtained.  The right shoulder was sterilely prepped.  3 cc of lidocaine and 3 cc of Marcaine were injected both in the AC joint as well as the distal clavicle and acromion periosteum.    Plan:  Patient will continue rehabilitation with his .  We will reassess him to at the end of next week to see if an injection is required for next week's game against [place] [employer].

## 2024-01-24 ENCOUNTER — TELEPHONE (OUTPATIENT)
Dept: ORTHOPEDICS | Facility: CLINIC | Age: 24
End: 2024-01-24
Payer: COMMERCIAL

## 2024-01-24 NOTE — TELEPHONE ENCOUNTER
Called to schedule appt with Dr. Jimenez for back pain from L4-5 microdiscectomy and laminectomy with Dr. Rosenberg at [facility] in May 2020. Voicemail box full and no mychart

## 2024-01-25 DIAGNOSIS — M54.50 LOW BACK PAIN: Primary | ICD-10-CM

## 2024-01-30 ENCOUNTER — ANCILLARY PROCEDURE (OUTPATIENT)
Dept: GENERAL RADIOLOGY | Facility: CLINIC | Age: 24
End: 2024-01-30
Attending: ORTHOPAEDIC SURGERY
Payer: COMMERCIAL

## 2024-01-30 ENCOUNTER — OFFICE VISIT (OUTPATIENT)
Dept: ORTHOPEDICS | Facility: CLINIC | Age: 24
End: 2024-01-30
Payer: COMMERCIAL

## 2024-01-30 DIAGNOSIS — M54.50 LOW BACK PAIN: ICD-10-CM

## 2024-01-30 DIAGNOSIS — M51.26 HERNIATED LUMBAR INTERVERTEBRAL DISC: ICD-10-CM

## 2024-01-30 DIAGNOSIS — M54.16 LUMBAR RADICULOPATHY: Primary | ICD-10-CM

## 2024-01-30 PROCEDURE — 99214 OFFICE O/P EST MOD 30 MIN: CPT | Mod: GC | Performed by: ORTHOPAEDIC SURGERY

## 2024-01-30 PROCEDURE — 72110 X-RAY EXAM L-2 SPINE 4/>VWS: CPT | Performed by: STUDENT IN AN ORGANIZED HEALTH CARE EDUCATION/TRAINING PROGRAM

## 2024-01-30 RX ORDER — GABAPENTIN 300 MG/1
CAPSULE ORAL
Qty: 111 CAPSULE | Refills: 0 | Status: SHIPPED | OUTPATIENT
Start: 2024-01-30 | End: 2024-03-14

## 2024-01-30 RX ORDER — GABAPENTIN 300 MG/1
CAPSULE ORAL
Qty: 90 CAPSULE | Refills: 1 | Status: CANCELLED | OUTPATIENT
Start: 2024-01-30

## 2024-01-30 NOTE — PROGRESS NOTES
Spine Surgery Return Clinic Visit      Chief Complaint:   RECHECK (Patient returns to clinic to discuss his low back pain.  No new acute injuries from this season.  He would like to discuss his treatment options.  He is accompanied by Giles, his ATC from the [facility].)      Interval HPI:  Symptom Profile Including: location of symptoms, onset, severity, exacerbating/alleviating factors, previous treatments:        Francesco Oro is a 23 year old male [place]  presenting with follow-up for his low back pain.  Of note he underwent an L4-5 microdiscectomy and laminectomy with Dr. Nick Rosenberg MD at [facility] in 5/2020.  For that surgery he failed conservative management in the form of steroid injections, physical therapy and gabapentin.  Today he presents with continued low back pain and neurogenic claudication to the left lower extremity specifically the posterior thigh.  He is interested in interventions that may relieve this pain in the playing hockey classifiable.  He denies any changes in bowel or bladder, denies any saddle anesthesia or weakness in his lower extremity.  At home he takes Tylenol and ibuprofen as needed.  He engages in physical therapy with part of his training, but this has not provided any relief.  He does report that before his surgery in 2020 he had intermittent relief with the use of gabapentin.          Past Medical History:   No past medical history on file.         Past Surgical History:   No past surgical history on file.         Social History:     Social History     Tobacco Use    Smoking status: Not on file    Smokeless tobacco: Not on file   Substance Use Topics    Alcohol use: Not on file            Family History:   No family history on file.         Allergies:     Allergies   Allergen Reactions    Penicillin G     Sulfa Antibiotics             Medications:     Current Outpatient Medications   Medication    benzonatate (TESSALON) 100 MG capsule    fluticasone (FLONASE) 50 MCG/ACT  nasal spray    fluticasone-salmeterol (ADVAIR) 250-50 MCG/ACT inhaler    meloxicam (MOBIC) 15 MG tablet    methylPREDNISolone (MEDROL DOSEPAK) 4 MG tablet therapy pack    montelukast (SINGULAIR) 10 MG tablet    predniSONE (DELTASONE) 20 MG tablet     No current facility-administered medications for this visit.             Review of Systems:   A focused musculoskeletal and neurologic ROS was performed with pertinent positives and negatives noted in the HPI.  Additional systems were also reviewed and are documented at the bottom of the note.         Physical Exam:   Vitals: There were no vitals taken for this visit.  Musculoskeletal, Neurologic, and Spine:      Lumbar Spine:    Appearance - No gross stepoffs or deformities    Motor -     L2-3: Hip flexion R 5/5  And L 5/5 strength          L3/4:  Knee extension R 5/5 and L 5/5 strength         L4/5:  Foot dorsiflexion R 5/5 L 5/5 and       EHL dorsiflexion R 5/5 L 5/5 strength         S1:  Plantarflexion/Peroneal Muscles  R 5/5 and L 5/5 strength    Sensation: intact to light touch L3-S1 distribution BLE        Positive piriformis test , tender around the piriformis which seems to reproduce his leg symptoms       Neurologic:  REFLEXES Right Left   Patella 2+ 2+   Ankle jerk 2+ 2+   Babinski No upgoing great toe No upgoing great toe   Clonus 0 beats 0 beats       Alignment:  Patient stands with a neutral standing sagittal balance.           Imaging:   We ordered and independently reviewed new radiographs at this clinic visit. The results were discussed with the patient. Findings include: No acute osseous abnormality or significant lumbar spine degenerative changes.    I also reviewed his MRIs from the last 3 years, which showed no severe stenosis and no obvious granulation tissue or disc material       Assessment and Plan:     23 year old male with chronic low back pain status post L4-L5 microdiscectomy in 2020.  He has not achieved relief of his symptoms after  surgical intervention or with conservative management.  Today we performed piriformis test, which proved positive.  This may indicate irritation of the sciatic nerve by a tight piriformis muscle or tendon.  Imaging does not relay any structural changes or degenerative changes in the spine that require surgical intervention.  As such recommending conservative management including a retrial of gabapentin.  This provided relief before his last surgery, and may be worth trying again.  Possible piriformis injection.  I will discuss with the sports team physicians as well.      Follow-up as needed      Paris Orosco MD  Orthopaedic Surgery, PGY-1  [phone]    Attending MD (Dr. Leoncio Jimenez) :  I met with the patient, reviewed and verified the history and physical exam of the patient and discussed the patient's management with the other clinical providers involved in this patient's care including any involved residents or physicians assistants. I reviewed the above note and agree with the documented findings and plan of care, which were communicated to the patient.      Leoncio Jimenez MD      Respectfully,  Leoncio Jimenez MD  Spine Surgery  [facility]

## 2024-01-30 NOTE — LETTER
1/30/2024         RE: Francesco Oro  [address]  [facility] [address]        Dear Colleague,    Thank you for referring your patient, Francesco Oro, to the [facility] [place]. Please see a copy of my visit note below.    Spine Surgery Return Clinic Visit      Chief Complaint:   RECHECK (Patient returns to clinic to discuss his low back pain.  No new acute injuries from this season.  He would like to discuss his treatment options.  He is accompanied by Giles, his ATC from the [facility].)      Interval HPI:  Symptom Profile Including: location of symptoms, onset, severity, exacerbating/alleviating factors, previous treatments:        Francesco Oro is a 23 year old male [place]  presenting with follow-up for his low back pain.  Of note he underwent an L4-5 microdiscectomy and laminectomy with Dr. Nick Rosenberg MD at [facility] in 5/2020.  For that surgery he failed conservative management in the form of steroid injections, physical therapy and gabapentin.  Today he presents with continued low back pain and neurogenic claudication to the left lower extremity specifically the posterior thigh.  He is interested in interventions that may relieve this pain in the playing hockey classifiable.  He denies any changes in bowel or bladder, denies any saddle anesthesia or weakness in his lower extremity.  At home he takes Tylenol and ibuprofen as needed.  He engages in physical therapy with part of his training, but this has not provided any relief.  He does report that before his surgery in 2020 he had intermittent relief with the use of gabapentin.          Past Medical History:   No past medical history on file.         Past Surgical History:   No past surgical history on file.         Social History:     Social History     Tobacco Use    Smoking status: Not on file    Smokeless tobacco: Not on file   Substance Use Topics    Alcohol use: Not on file            Family History:    No family history on file.         Allergies:     Allergies   Allergen Reactions    Penicillin G     Sulfa Antibiotics             Medications:     Current Outpatient Medications   Medication    benzonatate (TESSALON) 100 MG capsule    fluticasone (FLONASE) 50 MCG/ACT nasal spray    fluticasone-salmeterol (ADVAIR) 250-50 MCG/ACT inhaler    meloxicam (MOBIC) 15 MG tablet    methylPREDNISolone (MEDROL DOSEPAK) 4 MG tablet therapy pack    montelukast (SINGULAIR) 10 MG tablet    predniSONE (DELTASONE) 20 MG tablet     No current facility-administered medications for this visit.             Review of Systems:   A focused musculoskeletal and neurologic ROS was performed with pertinent positives and negatives noted in the HPI.  Additional systems were also reviewed and are documented at the bottom of the note.         Physical Exam:   Vitals: There were no vitals taken for this visit.  Musculoskeletal, Neurologic, and Spine:      Lumbar Spine:    Appearance - No gross stepoffs or deformities    Motor -     L2-3: Hip flexion R 5/5  And L 5/5 strength          L3/4:  Knee extension R 5/5 and L 5/5 strength         L4/5:  Foot dorsiflexion R 5/5 L 5/5 and       EHL dorsiflexion R 5/5 L 5/5 strength         S1:  Plantarflexion/Peroneal Muscles  R 5/5 and L 5/5 strength    Sensation: intact to light touch L3-S1 distribution BLE        Positive piriformis test , tender around the piriformis which seems to reproduce his leg symptoms       Neurologic:  REFLEXES Right Left   Patella 2+ 2+   Ankle jerk 2+ 2+   Babinski No upgoing great toe No upgoing great toe   Clonus 0 beats 0 beats       Alignment:  Patient stands with a neutral standing sagittal balance.           Imaging:   We ordered and independently reviewed new radiographs at this clinic visit. The results were discussed with the patient. Findings include: No acute osseous abnormality or significant lumbar spine degenerative changes.    I also reviewed his MRIs from the  last 3 years, which showed no severe stenosis and no obvious granulation tissue or disc material       Assessment and Plan:     23 year old male with chronic low back pain status post L4-L5 microdiscectomy in 2020.  He has not achieved relief of his symptoms after surgical intervention or with conservative management.  Today we performed piriformis test, which proved positive.  This may indicate irritation of the sciatic nerve by a tight piriformis muscle or tendon.  Imaging does not relay any structural changes or degenerative changes in the spine that require surgical intervention.  As such recommending conservative management including a retrial of gabapentin.  This provided relief before his last surgery, and may be worth trying again.  Possible piriformis injection.  I will discuss with the sports team physicians as well.      Follow-up as needed      Paris Orosco MD  Orthopaedic Surgery, PGY-1  [phone]    Attending MD (Dr. Leoncio Jimenez) :  I met with the patient, reviewed and verified the history and physical exam of the patient and discussed the patient's management with the other clinical providers involved in this patient's care including any involved residents or physicians assistants. I reviewed the above note and agree with the documented findings and plan of care, which were communicated to the patient.      Leoncio Jimenez MD      Respectfully,  Leoncio Jimenez MD  Spine Surgery  [facility]

## 2024-01-30 NOTE — NURSING NOTE
Reason For Visit:   Chief Complaint   Patient presents with    RECHECK     Patient returns to clinic to discuss his low back pain.  No new acute injuries from this season.  He would like to discuss his treatment options.  He is accompanied by Giles, his ATC from the [facility].       Primary MD: [employer]  Ref. MD: est    ?  No  Occupation: full time student   Currently working? No.  Date of surgery: Early may   Type of surgery: Microdiscectomy L4-L5  Smoker: No  Request smoking cessation information: No    There were no vitals taken for this visit.    Pain Assessment  Patient Currently in Pain: Yes  0-10 Pain Scale: 3 (pain can increase with prolonged sitting)  Primary Pain Location: Back    Oswestry (CHERI) Questionnaire        1/30/2024     2:53 PM   OSWESTRY DISABILITY INDEX   Count 9   Sum 9   Oswestry Score (%) 20 %            Neck Disability Index (NDI) Questionnaire         No data to display                       Visual Analog Pain Scale  Back Pain Scale 0-10: 3  Right leg pain: 0  Left leg pain: 0  Neck Pain Scale 0-10: 0  Right arm pain: 0  Left arm pain: 0    Promis 10 Assessment         No data to display                         Hsamukh Wilkerson, ATC

## 2024-04-10 ENCOUNTER — OFFICE VISIT (OUTPATIENT)
Dept: NEUROLOGY | Facility: CLINIC | Age: 24
End: 2024-04-10
Payer: COMMERCIAL

## 2024-04-10 DIAGNOSIS — M54.16 LUMBAR RADICULOPATHY: ICD-10-CM

## 2024-04-10 DIAGNOSIS — G89.29 CHRONIC LEFT-SIDED LOW BACK PAIN WITH LEFT-SIDED SCIATICA: Primary | ICD-10-CM

## 2024-04-10 DIAGNOSIS — M51.360 LUMBAR DISCOGENIC PAIN SYNDROME: ICD-10-CM

## 2024-04-10 DIAGNOSIS — M54.16 LUMBAR RADICULOPATHY: Primary | ICD-10-CM

## 2024-04-10 DIAGNOSIS — M54.42 CHRONIC LEFT-SIDED LOW BACK PAIN WITH LEFT-SIDED SCIATICA: Primary | ICD-10-CM

## 2024-04-10 PROCEDURE — 95910 NRV CNDJ TEST 7-8 STUDIES: CPT | Performed by: PSYCHIATRY & NEUROLOGY

## 2024-04-10 PROCEDURE — 95886 MUSC TEST DONE W/N TEST COMP: CPT | Performed by: PSYCHIATRY & NEUROLOGY

## 2024-04-10 NOTE — LETTER
4/10/2024       RE: Francesco Oro  [address]  [facility] [address]     Dear Colleague,    Thank you for referring your patient, Francesco Oro, to the [facility] CLIN[facility] [place] at [facility]. Please see a copy of my visit note below.                        [facility]  Electrodiagnostic Laboratory                 Department of Neurology                                                                                                         Test Date:  4/10/2024    Patient: Francesco Oro : 2000 Physician: Ryan Khan MD   Sex: Male AGE: 24 year Ref Phys: Dr. 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 This study is being performed to investigate for radiculopathy vs focal neuropathy, with a particular interest in piriformis syndrome.     Techniques:  Motor and sensory conduction studies were done with surface recording electrodes. EMG was done with a concentric needle electrode.     Results:  Nerve conduction studies:   1. Bilateral sural and superficial peroneal sensory responses are normal.   2. Bilateral peroneal-EDB and tibial-AH motor responses are normal.     Needle EMG of selected proximal and distal left lower limb muscles was performed as tabulated below. No abnormal spontaneous activity was observed in the sampled muscles. Motor unit potential morphology and recruitment patterns were normal.     Interpretation:  This is a normal study. There is no electrophysiologic evidence of a focal neuropathy or lumbosacral radiculopathy affecting the left lower limb on the basis of this study.     Ryan Khan MD  Department of Neurology        Nerve Conduction Studies  Motor Sites      Latency Amplitude Neg. Amp Diff Segment Distance Velocity Neg. Dur Neg Area Diff Temperature Comment   Site (ms) Norm (mV) Norm (%)  cm  m/s Norm (ms) (%) ( C)    Left Fibular (EDB) Motor   Ankle 4.2  < 6.0 8.2  > 2.5  Ankle-EDB 8   6.3  31.8    Bel Fibular Head 12.4 - 6.9 - -16 Bel Fibular Head-Ankle 36 44  > 38 7.1 -9 31.8    Pop Fossa 14.2 - 6.4 - -7 Pop Fossa-Bel Fibular Head 9 50  > 38 6.9 -7 31.7    Right Fibular (EDB) Motor   Ankle 3.8  < 6.0 7.3  > 2.5  Ankle-EDB 8   7.0  32    Left Tibial (AHB) Motor   Ankle 3.7  < 6.5 11.0  > 5.0  Ankle-AH 8   7.7  31.7    Knee 12.2 - 7.3 - -34 Knee-Ankle 44 52  > 38 9.1 -19 31.7    Right Tibial (AHB) Motor   Ankle 3.5  < 6.5 13.7  > 5.0  Ankle-AH 8   7.8  31.7      Sensory Sites      Onset Lat Peak Lat Amp (O-P) Amp (P-P) Segment Distance Velocity Temperature Comment   Site ms (ms)  V Norm ( V)  cm m/s Norm ( C)    Left Superficial Fibular Sensory   14 cm-Ankle 3.0 3.7 6  > 3 3 14 cm-Ankle 12.5 42  > 38 30.6    Right Superficial Fibular Sensory   14 cm-Ankle 3.1 4.0 7  > 3 4 14 cm-Ankle 12.5 40  > 38 31.9    Left Sural Sensory   Calf-Lat Malleolus 2.6 3.4 25  > 5 29 Calf-Lat Malleolus 14 54  > 38 31.3    Right Sural Sensory   Calf-Lat Malleolus 3.0 3.9 21  > 5 17 Calf-Lat Malleolus 14 47  > 38 32      F Wave Studies     Min-F Max-F Dispersion Persistence Mean-F F-Norm L-R Mean-F L-R Mean-F Norm F/M Ratio F-M Lat (ms)   Left Tibial (Abd Hallucis)  31.8  C   52.03 54.53 2.50 100.00 53.13 <61  <5.7 2.67 48.59       Electromyography     Side Muscle Ins Act Fibs/PSW Fasc HF Amp Dur Poly Recrt Int Pat   Left Vastus Lat Nml None Nml 0 Nml Nml 0 Nml Nml   Left Tib Anterior Nml None Nml 0 Nml Nml 0 Nml Nml   Left Gastroc Nml None Nml 0 Nml Nml 0 Nml Nml   Left Tib Posterior Nml None Nml 0 Nml Nml 0 Nml Nml   Left Semitendinosus Nml None Nml 0 Nml Nml 0 Nml Nml   Left Gluteus Med Nml None Nml 0 Nml Nml 0 Nml Nml   Left Gluteus Max Nml None Nml 0 Nml Nml 0 Nml Nml         NCS Waveforms:    Motor                Sensory                 Ultrasound Images:    Again, thank you for allowing me to participate in the care of  your patient.      Sincerely,    Ryan Khan MD

## 2024-04-10 NOTE — PROGRESS NOTES
[facility]  Electrodiagnostic Laboratory                 Department of Neurology                                                                                                         Test Date:  4/10/2024    Patient: Francesco Oro : 2000 Physician: Ryan Khan MD   Sex: Male AGE: 24 year Ref Phys: Dr. 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 This study is being performed to investigate for radiculopathy vs focal neuropathy, with a particular interest in piriformis syndrome.     Techniques:  Motor and sensory conduction studies were done with surface recording electrodes. EMG was done with a concentric needle electrode.     Results:  Nerve conduction studies:   1. Bilateral sural and superficial peroneal sensory responses are normal.   2. Bilateral peroneal-EDB and tibial-AH motor responses are normal.     Needle EMG of selected proximal and distal left lower limb muscles was performed as tabulated below. No abnormal spontaneous activity was observed in the sampled muscles. Motor unit potential morphology and recruitment patterns were normal.     Interpretation:  This is a normal study. There is no electrophysiologic evidence of a focal neuropathy or lumbosacral radiculopathy affecting the left lower limb on the basis of this study.     Ryan Khan MD  Department of Neurology        Nerve Conduction Studies  Motor Sites      Latency Amplitude Neg. Amp Diff Segment Distance Velocity Neg. Dur Neg Area Diff Temperature Comment   Site (ms) Norm (mV) Norm (%)  cm m/s Norm (ms) (%) ( C)    Left Fibular (EDB) Motor   Ankle 4.2  < 6.0 8.2  > 2.5  Ankle-EDB 8   6.3  31.8    Bel Fibular Head 12.4 - 6.9 - -16 Bel Fibular Head-Ankle 36 44  > 38 7.1 -9 31.8    Pop Fossa 14.2 - 6.4 - -7 Pop Fossa-Bel Fibular Head 9 50  > 38 6.9 -7 31.7    Right Fibular (EDB) Motor   Ankle 3.8  <  6.0 7.3  > 2.5  Ankle-EDB 8   7.0  32    Left Tibial (AHB) Motor   Ankle 3.7  < 6.5 11.0  > 5.0  Ankle-AH 8   7.7  31.7    Knee 12.2 - 7.3 - -34 Knee-Ankle 44 52  > 38 9.1 -19 31.7    Right Tibial (AHB) Motor   Ankle 3.5  < 6.5 13.7  > 5.0  Ankle-AH 8   7.8  31.7      Sensory Sites      Onset Lat Peak Lat Amp (O-P) Amp (P-P) Segment Distance Velocity Temperature Comment   Site ms (ms)  V Norm ( V)  cm m/s Norm ( C)    Left Superficial Fibular Sensory   14 cm-Ankle 3.0 3.7 6  > 3 3 14 cm-Ankle 12.5 42  > 38 30.6    Right Superficial Fibular Sensory   14 cm-Ankle 3.1 4.0 7  > 3 4 14 cm-Ankle 12.5 40  > 38 31.9    Left Sural Sensory   Calf-Lat Malleolus 2.6 3.4 25  > 5 29 Calf-Lat Malleolus 14 54  > 38 31.3    Right Sural Sensory   Calf-Lat Malleolus 3.0 3.9 21  > 5 17 Calf-Lat Malleolus 14 47  > 38 32      F Wave Studies     Min-F Max-F Dispersion Persistence Mean-F F-Norm L-R Mean-F L-R Mean-F Norm F/M Ratio F-M Lat (ms)   Left Tibial (Abd Hallucis)  31.8  C   52.03 54.53 2.50 100.00 53.13 <61  <5.7 2.67 48.59       Electromyography     Side Muscle Ins Act Fibs/PSW Fasc HF Amp Dur Poly Recrt Int Pat   Left Vastus Lat Nml None Nml 0 Nml Nml 0 Nml Nml   Left Tib Anterior Nml None Nml 0 Nml Nml 0 Nml Nml   Left Gastroc Nml None Nml 0 Nml Nml 0 Nml Nml   Left Tib Posterior Nml None Nml 0 Nml Nml 0 Nml Nml   Left Semitendinosus Nml None Nml 0 Nml Nml 0 Nml Nml   Left Gluteus Med Nml None Nml 0 Nml Nml 0 Nml Nml   Left Gluteus Max Nml None Nml 0 Nml Nml 0 Nml Nml         NCS Waveforms:    Motor                Sensory                   Ultrasound Images:

## 2024-05-22 ENCOUNTER — OFFICE VISIT (OUTPATIENT)
Dept: ORTHOPEDICS | Facility: CLINIC | Age: 24
End: 2024-05-22
Payer: COMMERCIAL

## 2024-05-22 DIAGNOSIS — M25.511 PAIN IN JOINT OF RIGHT SHOULDER: Primary | ICD-10-CM

## 2024-05-22 NOTE — LETTER
5/22/2024      RE: Francesco Oro  [address] [place] [address]     Dear Colleague,    Thank you for referring your patient, Francesco Oro, to the [facility]. Please see a copy of my visit note below.    Chief Complaint: Right shoulder pain    Sport:  Hockey    History of Present Illness:  Francesco is a 24-year-old  with persistent right shoulder pain.  His pain seems to be primarily related to his right AC joint.  It is worse with weight training.  He is able to play.  He did sustain a right AC joint injury in the fall.  Francesco did have local anesthetic injection into the joint with some relief.  He does note some clicking inside his shoulder.    Physical Examination:  24-year-old male alert oriented in no apparent distress.  Full and symmetrical range of motion of the shoulder.  Tender at the AC joint.  Slightly tender at the bicipital groove.  Rotator cuff strength is 5/5.  Positive crossarm.  Positive [place]'s.  Negative apprehension.    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  Jena and I had a long conversation about this injury.  We discussed treatment options.  We discussed injections.  We discussed surgical management.  I did explain that if we did a distal clavicle excision he probably would miss 8 weeks of heavy training.  He does not think his symptoms warrant any type of surgical intervention.  We discussed a corticosteroid injection.  Think this would be very appropriate.  However I would like to obtain advanced imaging.  He does have a click inside his shoulder I do want to make sure that his labrum is okay.  In addition we can take a look at the extent of his AC joint irritation.    Plan:  We will obtain a right shoulder MRI to evaluate his superior labrum and AC joint  If his AC joint is irritated and he does not have a labral tear we will have a conversation about a right AC joint  corticosteroid injection.    20 minutes was spent on the date of the encounter doing chart review, patient visit, and documentation.    Seen with his .      Again, thank you for allowing me to participate in the care of your patient.      Sincerely,    Kali Rubalcava MD

## 2024-05-24 ENCOUNTER — TELEPHONE (OUTPATIENT)
Dept: ORTHOPEDICS | Facility: CLINIC | Age: 24
End: 2024-05-24

## 2024-05-24 DIAGNOSIS — S43.50XA ACROMIOCLAVICULAR SPRAIN: Primary | ICD-10-CM

## 2024-05-24 NOTE — PROGRESS NOTES
Chief Complaint: Right shoulder pain    Sport:  Hockey    History of Present Illness:  Francesco is a 24-year-old  with persistent right shoulder pain.  His pain seems to be primarily related to his right AC joint.  It is worse with weight training.  He is able to play.  He did sustain a right AC joint injury in the fall.  Francesco did have local anesthetic injection into the joint with some relief.  He does note some clicking inside his shoulder.    Physical Examination:  24-year-old male alert oriented in no apparent distress.  Full and symmetrical range of motion of the shoulder.  Tender at the AC joint.  Slightly tender at the bicipital groove.  Rotator cuff strength is 5/5.  Positive crossarm.  Positive [place]'s.  Negative apprehension.    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  Jena and I had a long conversation about this injury.  We discussed treatment options.  We discussed injections.  We discussed surgical management.  I did explain that if we did a distal clavicle excision he probably would miss 8 weeks of heavy training.  He does not think his symptoms warrant any type of surgical intervention.  We discussed a corticosteroid injection.  Think this would be very appropriate.  However I would like to obtain advanced imaging.  He does have a click inside his shoulder I do want to make sure that his labrum is okay.  In addition we can take a look at the extent of his AC joint irritation.    Plan:  We will obtain a right shoulder MRI to evaluate his superior labrum and AC joint  If his AC joint is irritated and he does not have a labral tear we will have a conversation about a right AC joint corticosteroid injection.    20 minutes was spent on the date of the encounter doing chart review, patient visit, and documentation.    Seen with his .

## 2024-05-29 ENCOUNTER — ANCILLARY PROCEDURE (OUTPATIENT)
Dept: MRI IMAGING | Facility: CLINIC | Age: 24
End: 2024-05-29
Attending: ORTHOPAEDIC SURGERY
Payer: COMMERCIAL

## 2024-05-29 DIAGNOSIS — S43.50XA ACROMIOCLAVICULAR SPRAIN: ICD-10-CM

## 2024-05-29 PROCEDURE — 73221 MRI JOINT UPR EXTREM W/O DYE: CPT | Mod: RT | Performed by: RADIOLOGY

## 2024-10-26 DIAGNOSIS — J20.9 ACUTE BRONCHITIS, UNSPECIFIED ORGANISM: ICD-10-CM

## 2024-10-26 DIAGNOSIS — J45.41 MODERATE PERSISTENT ASTHMA WITH ACUTE EXACERBATION: ICD-10-CM

## 2024-10-26 RX ORDER — FLUTICASONE PROPIONATE AND SALMETEROL 250; 50 UG/1; UG/1
1 POWDER RESPIRATORY (INHALATION) EVERY 12 HOURS
Qty: 1 EACH | Refills: 5 | Status: SHIPPED | OUTPATIENT
Start: 2024-10-26

## 2024-12-06 ENCOUNTER — OFFICE VISIT (OUTPATIENT)
Dept: FAMILY MEDICINE | Facility: CLINIC | Age: 24
End: 2024-12-06
Payer: COMMERCIAL

## 2024-12-06 DIAGNOSIS — S06.0X0A CONCUSSION WITHOUT LOSS OF CONSCIOUSNESS, INITIAL ENCOUNTER: Primary | ICD-10-CM

## 2024-12-09 DIAGNOSIS — M54.16 LUMBAR RADICULOPATHY: Primary | ICD-10-CM

## 2024-12-09 NOTE — PROGRESS NOTES
"12/9/2024  Francesco Oro  2000  [id number]      SUBJECTIVE:  Patient is a 24 year old male [facility]  who presents with head injury.  Sport Men's Hockey  Position Forward  Date and Time of injury 12/6/2024    During the 2nd period of the game, Francesco was hit by an opposing player on the R side of the head/ jaw area. He reports no LOC and recalls events leading up to and before the hit. Due to pain near the site of contact, he lay on the ice for approx 1 min. After ATC Giles Sandy went to check on him, he was able to skate off the ice under his own power. Immediately after, in the training room, Francesco reported ongoing pain to the site of contact as well as pressure/ headache in the top of this head. He also reports feeling somewhat out of it, but otherwise denies other symptoms.      LOC:  No    AMNESIA  Retrograde No  Anterograde No    Past Pertinent History  Prior concussions: Yes  Number of Prior Concussions and time to resolution: 2.   Date of last concussion: 2/11/2023    Seizures: No   Headaches: No   Migraines: No   Tremor: No   Depression: No   Attention deficit disorder: No   Learning disability: No         Symptom Score:  Headache  1  \"Pressure in head\"  1  Neck Pain  0  Nausea or vomiting  0  Dizziness  1  Blurred vision  0  Balance problems  0  Sensitivity to light  1  Sensitivity to noise  0  Feeling slowed down 2  Feeling like \"in a fog\"  2  \"Don't feel right\"  0  Difficulty concentrating  0  Difficulty remembering  0  Fatigue or low energy  0  Confusion  1  Drowsiness  0  More emotional  0  Irritability  0  Sadness  0  Nervous or Anxious 0  Trouble falling asleep (if applicable)  n/a    Total number of symptoms: 9/22  Symptom severity score: 12/120        Current Outpatient Medications   Medication Sig Dispense Refill    benzonatate (TESSALON) 100 MG capsule Take 1 capsule (100 mg) by mouth 3 times daily as needed for cough 30 capsule 0    fluticasone (FLONASE) 50 MCG/ACT nasal spray Spray " 1 spray into both nostrils 2 times daily 9.9 mL 0    fluticasone-salmeterol (ADVAIR) 250-50 MCG/ACT inhaler Inhale 1 puff into the lungs every 12 hours. 1 each 5    gabapentin (NEURONTIN) 300 MG capsule Take 1 capsule (300 mg) by mouth at bedtime for 7 days, THEN 1 capsule (300 mg) two times daily for 7 days, THEN 1 capsule (300 mg) 3 times daily for 30 days. 111 capsule 0    meloxicam (MOBIC) 15 MG tablet Take 1 tablet (15 mg) by mouth daily Take daily for 7 days, then daily as needed 30 tablet 0    methylPREDNISolone (MEDROL DOSEPAK) 4 MG tablet therapy pack Take as directed 21 tablet 0    montelukast (SINGULAIR) 10 MG tablet Take 1 tablet (10 mg) by mouth At Bedtime 30 tablet 0    predniSONE (DELTASONE) 20 MG tablet Take 2 tablets (40 mg) by mouth daily 10 tablet 0       Allergies   Allergen Reactions    Penicillin G     Sulfa Antibiotics          Social History     Socioeconomic History    Marital status:      Spouse name: Not on file    Number of children: Not on file    Years of education: Not on file    Highest education level: Not on file   Occupational History    Not on file   Tobacco Use    Smoking status: Not on file    Smokeless tobacco: Not on file   Substance and Sexual Activity    Alcohol use: Not on file    Drug use: Not on file    Sexual activity: Not on file   Other Topics Concern    Not on file   Social History Narrative    Not on file     Social Drivers of Health     Financial Resource Strain: Not on file   Food Insecurity: Not on file   Transportation Needs: Not on file   Physical Activity: Sufficiently Active (9/26/2019)    [facility]    Exercise Vital Sign     Days of Exercise per Week: 7 days     Minutes of Exercise per Session: 60 min   Stress: Not on file   Social Connections: Not on file   Interpersonal Safety: Not on file   Housing Stability: Not on file       No family history on file.      OBJECTIVE:  Alert and  orientated x3.     Glascow Coma Scale  Eyes open (wnl=4, voice=3, pain=2, none=1): 4  Speech (wnl=5, disoriented=4, inappropriate=3, incoherent=2, none=1): 5  Motor: (wnl=6, localizes=5, withdraws=4, flexed=3, extended=2, none=1): 6    Cervical Spine Exam  Full ROM of cervical spine  Strength:  Shoulder shrug (C5):5/5  Deltoid (C5): 5/5  Bicep (C6):5/5  Wrist Extension (C6): 5/5  Tricep (C7):5/5  Wrist Flexion (C7): 5/5  Finger Flexion (C8/T1):5/5      Cranial Nerves 2-12:  intact  PERRLA:Yes  EOMI:Yes  Nystagmus: No  Painful Eye movements: No         Vestibular/Ocular Motor Test:  - Saccades- Horizontal worsened headache and dizziness  - VOR- Horizontal worsened dizziness  Otherwise normal      ASSESSMENT:  Concussion without loss of consciousness.  Not cleared.    PLAN:   The patient, Giles Sandy ATC, and I discussed his symptoms and findings.    Patient was given educational material about concussion symptoms and return to play. We discussed the importance of relative brain rest which includes cognitive rest as well as rest from any exertional activity. Patient was advised to call with any concerns or change in symptoms. If the patient develops any sudden changes, such as extreme drowsiness or declining neurologic function, they were advised to go directly to the ER.   Patient is agreeable to the plan.    - Medications: tylenol for headache/ jaw pain  - Follow-up with Giles Sandy ATC for initiation of concussion protocol once minimally or asymptomatic    Follow-up later this week to consider clearance if progressed fully through protocol, sooner if worsening.    Laurent Fletcher MD, CAQ  Primary Care Sports Medicine

## 2024-12-13 ENCOUNTER — DOCUMENTATION ONLY (OUTPATIENT)
Dept: FAMILY MEDICINE | Facility: CLINIC | Age: 24
End: 2024-12-13

## 2024-12-13 ENCOUNTER — OFFICE VISIT (OUTPATIENT)
Dept: ORTHOPEDICS | Facility: CLINIC | Age: 24
End: 2024-12-13
Payer: COMMERCIAL

## 2024-12-13 DIAGNOSIS — S01.81XA CHIN LACERATION, INITIAL ENCOUNTER: Primary | ICD-10-CM

## 2024-12-14 NOTE — PROGRESS NOTES
Chief Complaint: Chin laceration    Sport:  Hockey    History of Present Illness:  Francesco is a 24-year-old  who sustained a chin laceration.  No headache or fogginess.  No dental complaints.    Physical Examination:  12 mm relatively superficial laceration of the chin.    Impression:  Chin laceration.  Francesco and I discussed closure of his laceration.  I did discuss the risks and would like to proceed.    PROCEDURE NOTE: After explanation of risks and benefits, verbal consent was obtained.  The chin was sterilely prepped.  Anesthesia was obtained with 1% lidocaine.  5.0 nylon suture was used to close laceration.    Plan:  Padding for tomorrow night skein.  Sutures out in approximately 5 days.    Seen with Giles Sandy ATC

## 2024-12-16 ENCOUNTER — ANCILLARY PROCEDURE (OUTPATIENT)
Dept: INTERVENTIONAL RADIOLOGY/VASCULAR | Facility: CLINIC | Age: 24
End: 2024-12-16
Attending: FAMILY MEDICINE
Payer: COMMERCIAL

## 2024-12-16 VITALS — TEMPERATURE: 98 F | DIASTOLIC BLOOD PRESSURE: 81 MMHG | SYSTOLIC BLOOD PRESSURE: 138 MMHG

## 2024-12-16 DIAGNOSIS — M54.16 LUMBAR RADICULOPATHY: ICD-10-CM

## 2024-12-16 PROCEDURE — 64483 NJX AA&/STRD TFRM EPI L/S 1: CPT | Mod: LT | Performed by: RADIOLOGY

## 2024-12-16 RX ORDER — METHYLPREDNISOLONE ACETATE 80 MG/ML
80 INJECTION, SUSPENSION INTRA-ARTICULAR; INTRALESIONAL; INTRAMUSCULAR; SOFT TISSUE ONCE
Status: COMPLETED | OUTPATIENT
Start: 2024-12-16 | End: 2024-12-16

## 2024-12-16 RX ORDER — IOPAMIDOL 408 MG/ML
10 INJECTION, SOLUTION INTRATHECAL ONCE
Status: COMPLETED | OUTPATIENT
Start: 2024-12-16 | End: 2024-12-16

## 2024-12-16 RX ORDER — BUPIVACAINE HYDROCHLORIDE 5 MG/ML
10 INJECTION, SOLUTION EPIDURAL; INTRACAUDAL ONCE
Status: COMPLETED | OUTPATIENT
Start: 2024-12-16 | End: 2024-12-16

## 2024-12-16 RX ORDER — LIDOCAINE HYDROCHLORIDE 10 MG/ML
5 INJECTION, SOLUTION EPIDURAL; INFILTRATION; INTRACAUDAL; PERINEURAL ONCE
Status: COMPLETED | OUTPATIENT
Start: 2024-12-16 | End: 2024-12-16

## 2024-12-16 RX ADMIN — BUPIVACAINE HYDROCHLORIDE 2 ML: 5 INJECTION, SOLUTION EPIDURAL; INTRACAUDAL at 10:37

## 2024-12-16 RX ADMIN — LIDOCAINE HYDROCHLORIDE 5 ML: 10 INJECTION, SOLUTION EPIDURAL; INFILTRATION; INTRACAUDAL; PERINEURAL at 10:37

## 2024-12-16 RX ADMIN — METHYLPREDNISOLONE ACETATE 80 MG: 80 INJECTION, SUSPENSION INTRA-ARTICULAR; INTRALESIONAL; INTRAMUSCULAR; SOFT TISSUE at 10:37

## 2024-12-16 RX ADMIN — IOPAMIDOL 2 ML: 408 INJECTION, SOLUTION INTRATHECAL at 10:36

## 2024-12-16 NOTE — DISCHARGE INSTRUCTIONS
Date: 12/16/24  Provider: Dr. Jeong, neuroradiology department  Location: [facility], Imaging Department, [facility] Care Instructions after your Steroid Injection:  If you have new numbness down your legs after the injection, this may last up to 4-6 hours, but is expected go away.   Over the next 24 to 48 hours, pain at the injection site may increase before it gets better.  For the next 48 hours, use ice packs for 15 minutes, 3-4 times a day for pain relief.  If you have a bandage, remove it tomorrow morning.     Do not submerge injection site in water for 24 hours (no baths. pools, hot tubs).  Showers are OK.            Relax today. Return to your regular activity tomorrow.  Do not drive for 24 hours.         Medications  Restart blood thinning/anticoagulant medication tomorrow at your regular dose.  If you are restarting warfarin, discuss a follow up plan/labs with you anticoagulation clinic.  Continue to take all other medications as ordered by your provider.    Common side effects may include:  Increased blood sugar.  (If you are diabetic, watch your blood sugar closely. Call your doctor to help control your blood sugar)   Insomnia  Heartburn  Flushed face  Water retention  Increased appetite    Important Information:  The steroid should help reduce swelling and pain. This may take from 3-14 days.   Follow up with the provider that ordered this injection in 3-4 weeks. Let them know if the injection was effective.  No vaccines in the next 14 days. (Ex. COVID or FLU VACCINE)    Call your doctor or go to the Emergency Room for evaluation:  If you have NEW severe pain.  Fever greater than 100.5 degrees.  Loss of control of your legs (legs depending on level)

## 2025-01-20 NOTE — PROGRESS NOTES
Date of Injury: 12/6/24  Francesco was removed from the game tonight due to taking a check to the head.  Francesco did not experience LOC and was able to skate off the ice under his own power.  Once he came into the training room, he began reporting symptoms consistent with a concussion.  SCAT6 symptom inventory measured 9 symptoms with a severity score of 12.  Francesco saw our Team Physician Laurent Fletcher and was officially diagnosed with a concussion.  Francesco was issued a RTL #2 and sent home with take home instructions.     12/7/24  Francesco followed up today to complete a SCAT6 symptom inventory.  Today he has a symptom score of 4 with 2 different symptoms.  He will continue to rest today with no activity.    12/8/24  Francesco reports he no longer is experiencing any concussion like symptoms.      12/9/24  A SCAT6 and ImPact tests are taken today and both are consistent with baseline scores.  Francesco will begin their RTP progression with 20min on a stationary bike at a light/moderate pace.  He will stop biking if at any point his symptoms return.     12/10/24  Francesco progresses to sport specific activity today.  He will be allowed to skate on his own for 20-30min.  SA will stop if symptoms return at any point.    12/11/24  Francesco has remained symptom free and will progress to non-contact practice with the team.  If at any point during practice his symptoms return, he will be pulled from practice.  Additionally, Francesco is being issued a RTL #5 allowing for full academic activities.    12/12/24  Francesco will progress to full practice today.  If at any point during practice his symptoms return, he will be pulled.  If practice goes well, he will follow up with Team Physician for clearance for competition.    12/13/24  Francesco had no issues with full practice and has been cleared for competition by Team Physician Bari Swain.  The [facility] Concussion Management Plan has been followed.

## 2025-01-21 DIAGNOSIS — J20.9 ACUTE BRONCHITIS, UNSPECIFIED ORGANISM: ICD-10-CM

## 2025-01-21 DIAGNOSIS — J45.41 MODERATE PERSISTENT ASTHMA WITH ACUTE EXACERBATION: ICD-10-CM

## 2025-01-21 RX ORDER — FLUTICASONE PROPIONATE AND SALMETEROL 250; 50 UG/1; UG/1
1 POWDER RESPIRATORY (INHALATION) EVERY 12 HOURS
Qty: 1 EACH | Refills: 5 | Status: SHIPPED | OUTPATIENT
Start: 2025-01-21 | End: 2025-01-22

## 2025-01-21 RX ORDER — MONTELUKAST SODIUM 10 MG/1
10 TABLET ORAL AT BEDTIME
Qty: 30 TABLET | Refills: 1 | Status: SHIPPED | OUTPATIENT
Start: 2025-01-21

## 2025-01-22 RX ORDER — FLUTICASONE PROPIONATE AND SALMETEROL 250; 50 UG/1; UG/1
1 POWDER RESPIRATORY (INHALATION) EVERY 12 HOURS
Qty: 1 EACH | Refills: 5 | Status: SHIPPED | OUTPATIENT
Start: 2025-01-22

## 2025-01-27 ENCOUNTER — OFFICE VISIT (OUTPATIENT)
Dept: FAMILY MEDICINE | Facility: CLINIC | Age: 25
End: 2025-01-27
Payer: COMMERCIAL

## 2025-01-27 DIAGNOSIS — J30.9 ALLERGIC RHINITIS WITH POSTNASAL DRIP: ICD-10-CM

## 2025-01-27 DIAGNOSIS — J45.909 MODERATE ASTHMA WITHOUT COMPLICATION, UNSPECIFIED WHETHER PERSISTENT: Primary | ICD-10-CM

## 2025-01-27 DIAGNOSIS — R09.82 ALLERGIC RHINITIS WITH POSTNASAL DRIP: ICD-10-CM

## 2025-01-27 NOTE — PROGRESS NOTES
HISTORY OF PRESENT ILLNESS  Mr. Oro is a pleasant 24 year old year old male who presents to clinic today with the following:  What problem are you here for? Cough  Related to exercise induced moderate asthma  Has recently restarted advair and singulair  Continues to have dry cough with exercise  Pre workout nebs do not really  help much  Has a lot of nasal symptoms, runny nose this time of year        MEDICAL HISTORY  There is no problem list on file for this patient.      Current Outpatient Medications   Medication Sig Dispense Refill    benzonatate (TESSALON) 100 MG capsule Take 1 capsule (100 mg) by mouth 3 times daily as needed for cough 30 capsule 0    fluticasone (FLONASE) 50 MCG/ACT nasal spray Spray 1 spray into both nostrils 2 times daily 9.9 mL 0    fluticasone-salmeterol (ADVAIR) 250-50 MCG/ACT inhaler Inhale 1 puff into the lungs every 12 hours. 1 each 5    gabapentin (NEURONTIN) 300 MG capsule Take 1 capsule (300 mg) by mouth at bedtime for 7 days, THEN 1 capsule (300 mg) two times daily for 7 days, THEN 1 capsule (300 mg) 3 times daily for 30 days. 111 capsule 0    meloxicam (MOBIC) 15 MG tablet Take 1 tablet (15 mg) by mouth daily Take daily for 7 days, then daily as needed 30 tablet 0    methylPREDNISolone (MEDROL DOSEPAK) 4 MG tablet therapy pack Take as directed 21 tablet 0    montelukast (SINGULAIR) 10 MG tablet Take 1 tablet (10 mg) by mouth at bedtime. 30 tablet 1    predniSONE (DELTASONE) 20 MG tablet Take 2 tablets (40 mg) by mouth daily 10 tablet 0       Allergies   Allergen Reactions    Penicillin G     Sulfa Antibiotics        No family history on file.  Social History     Socioeconomic History    Marital status:      Social Drivers of Health     Physical Activity: Sufficiently Active (9/26/2019)    Received from [place] [employer], [facility] [employer] [facility]    Exercise Vital Sign     Days of Exercise per Week: 7 days     Minutes of Exercise per Session: 60 min        Additional medical/Social/Surgical histories reviewed in [facility] and updated as appropriate.     REVIEW OF SYSTEMS (1/27/2025)  10 point ROS of systems including Constitutional, Eyes, Respiratory, Cardiovascular, Gastroenterology, Genitourinary, Integumentary, Musculoskeletal, Psychiatric, Allergic/Immunologic were all negative except for pertinent positives noted in my HPI.     PHYSICAL EXAM  VSS  Exam:  Constitutional: healthy, alert, and no distress  Head: Normocephalic. No masses, lesions, tenderness or abnormalities  Neck: Neck supple. No adenopathy. Thyroid symmetric, normal size,, Carotids without bruits.  ENT: ENT exam abnormal- red, swollen turbinates, no neck nodes or sinus tenderness  Cardiovascular: negative, PMI normal. No lifts, heaves, or thrills. RRR. No murmurs, clicks gallops or rub  Respiratory: negative, Percussion normal. Good diaphragmatic excursion. Lungs clear  Skin: no suspicious lesions or rashes  Neurologic: Gait normal. Reflexes normal and symmetric. Sensation grossly WNL.  Psychiatric: mentation appears normal and affect normal/bright  Hematologic/Lymphatic/Immunologic: Normal cervical lymph nodes    ASSESSMENT & PLAN  25 yo male with exercise induced asthma, post nasal drip, cough    Cont. Advair, singulair  Start flonase nightly PRN  Followup in 1-2 weeks  Consider PFTs if not improving    Appropriate PPE was utilized for prevention of spread of Covid-19.  Prashant Swain MD, CAM

## 2025-01-29 DIAGNOSIS — J11.1 INFLUENZA: Primary | ICD-10-CM

## 2025-01-29 RX ORDER — OSELTAMIVIR PHOSPHATE 75 MG/1
75 CAPSULE ORAL 2 TIMES DAILY
Qty: 10 CAPSULE | Refills: 0 | Status: SHIPPED | OUTPATIENT
Start: 2025-01-29 | End: 2025-02-03

## 2025-02-19 DIAGNOSIS — J45.41 MODERATE PERSISTENT ASTHMA WITH ACUTE EXACERBATION: Primary | ICD-10-CM

## 2025-02-19 RX ORDER — FLUTICASONE PROPIONATE AND SALMETEROL 250; 50 UG/1; UG/1
1 POWDER RESPIRATORY (INHALATION) EVERY 12 HOURS
Qty: 1 EACH | Refills: 3 | Status: SHIPPED | OUTPATIENT
Start: 2025-02-19

## 2025-03-09 ENCOUNTER — OFFICE VISIT (OUTPATIENT)
Dept: ORTHOPEDICS | Facility: CLINIC | Age: 25
End: 2025-03-09
Payer: COMMERCIAL

## 2025-03-09 DIAGNOSIS — S83.412A SPRAIN OF MEDIAL COLLATERAL LIGAMENT OF LEFT KNEE, INITIAL ENCOUNTER: Primary | ICD-10-CM

## 2025-03-10 ENCOUNTER — ANCILLARY PROCEDURE (OUTPATIENT)
Dept: MRI IMAGING | Facility: CLINIC | Age: 25
End: 2025-03-10
Attending: FAMILY MEDICINE
Payer: COMMERCIAL

## 2025-03-10 DIAGNOSIS — S89.90XA KNEE INJURY: ICD-10-CM

## 2025-03-10 PROCEDURE — 73721 MRI JNT OF LWR EXTRE W/O DYE: CPT | Mod: LT | Performed by: RADIOLOGY

## 2025-03-10 NOTE — PROGRESS NOTES
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  Francesco suffered a collision with an opposing player, forcing his knee into a valgus motion.  This was immediately painful, he was helped off of the ice.  He has not had a previous, clinically significant knee injury.        Additional history: as documented    MEDICAL HISTORY  There is no problem list on file for this patient.      Current Outpatient Medications   Medication Sig Dispense Refill    benzonatate (TESSALON) 100 MG capsule Take 1 capsule (100 mg) by mouth 3 times daily as needed for cough 30 capsule 0    fluticasone (FLONASE) 50 MCG/ACT nasal spray Spray 1 spray into both nostrils 2 times daily 9.9 mL 0    fluticasone-salmeterol (ADVAIR) 250-50 MCG/ACT inhaler Inhale 1 puff into the lungs every 12 hours. 1 each 5    fluticasone-salmeterol (WIXELA INHUB) 250-50 MCG/ACT inhaler Inhale 1 puff into the lungs every 12 hours. 1 each 3    gabapentin (NEURONTIN) 300 MG capsule Take 1 capsule (300 mg) by mouth at bedtime for 7 days, THEN 1 capsule (300 mg) two times daily for 7 days, THEN 1 capsule (300 mg) 3 times daily for 30 days. 111 capsule 0    meloxicam (MOBIC) 15 MG tablet Take 1 tablet (15 mg) by mouth daily Take daily for 7 days, then daily as needed 30 tablet 0    methylPREDNISolone (MEDROL DOSEPAK) 4 MG tablet therapy pack Take as directed 21 tablet 0    montelukast (SINGULAIR) 10 MG tablet Take 1 tablet (10 mg) by mouth at bedtime. 30 tablet 1    predniSONE (DELTASONE) 20 MG tablet Take 2 tablets (40 mg) by mouth daily 10 tablet 0       Allergies   Allergen Reactions    Penicillin G     Sulfa Antibiotics        No family history on file.    Additional medical/Social/Surgical histories reviewed in [facility] and updated as appropriate.        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    Francesco's symptoms and clinical exam are consistent with an injury to the tibial collateral ligament.  We will assess formally tomorrow with imaging to include an x-ray and an MRI.    It was a pleasure seeing Francesco today.    Prashant Rea DO, [facility]  Primary Care Sports Medicine      This note was constructed using Dragon dictation software, please excuse any minor errors in spelling, grammar, or syntax.

## 2025-03-13 NOTE — TELEPHONE ENCOUNTER
Action March 13, 2025 1:11 PM MT   Action Taken Called [facility] radiology, talked to: Ana. Rep confirmed that the image was done with them, but the image is no longer available.         DIAGNOSIS: LEFT KNEE INJURY   APPOINTMENT DATE: 03/14/2025   NOTES STATUS DETAILS   OFFICE NOTE from referring provider Internal 03/09/2025 - Prashant Rea DO - [facility] Sports Medicine   OFFICE NOTE from other specialist Care Everywhere 09/13/2012 - Dorothy Pozo NP - [facility]   MRI PACS Internal:  03/10/2025 - Left Knee   XRAYS (IMAGES & REPORTS) Imaging no longer available. Just the report only in Care Everywhere.  [facility]:  09/17/2012 - Left Knee

## 2025-03-14 ENCOUNTER — PRE VISIT (OUTPATIENT)
Dept: ORTHOPEDICS | Facility: CLINIC | Age: 25
End: 2025-03-14
Payer: COMMERCIAL

## 2025-03-14 ENCOUNTER — OFFICE VISIT (OUTPATIENT)
Dept: ORTHOPEDICS | Facility: CLINIC | Age: 25
End: 2025-03-14
Payer: COMMERCIAL

## 2025-03-14 DIAGNOSIS — M25.562 ACUTE PAIN OF LEFT KNEE: Primary | ICD-10-CM

## 2025-03-14 PROCEDURE — 1125F AMNT PAIN NOTED PAIN PRSNT: CPT | Performed by: ORTHOPAEDIC SURGERY

## 2025-03-14 PROCEDURE — 99213 OFFICE O/P EST LOW 20 MIN: CPT | Performed by: ORTHOPAEDIC SURGERY

## 2025-03-14 NOTE — LETTER
3/14/2025      Francesco Oro  [address] [place] [address]      Dear Colleague,    Thank you for referring your patient, Francesco Oro, to the [facility] [place]. Please see a copy of my visit note below.    Chief Complaint: Left knee injury    Date of injury: March 9    History of Present Illness:  Parents a 25-year-old  who had a valgus injury to his left knee against [place].  He had an MRI scan.  He has been quite diligent utilizing his long hinged brace.  He is doing well.  He has much less pain.    Physical Examination:  45-year-old male alert oriented no apparent distress.  Questionable trace effusion.  Range of motion is symmetrical.  He is tender over his medial joint line and tender over his distal MCL.  Negative Lachman.  No posterior drawer.  No opening to varus opening.  There is no increased opening to valgus stress at full extension.  There may be very minimal increased opening at 30 degrees.    Imaging:  MR left knee without contrast 3/10/2025 2:20 PM     Techniques: Multiplanar multisequence imaging of the left knee was  obtained without administration of intra-articular or intravenous  contrast using routine protocol.     History: Presents as MCL injury; Knee injury     Comparison: None     Findings:     MENISCI:  Medial meniscus: Subtle tear in the posterior horn of medial meniscus  communicating with tibial articular surface (series 5 image 10).  Meniscocapsular sprain.  Lateral meniscus: Intact.     LIGAMENTS  Cruciate ligaments: Intact.  Medial supporting structures: Redundancy of MCL evidenced by marked  surrounding fluid and edema. This fluid extends through the pes  anserine bursa. Intermediate signal and thickening at the proximal  attachment.  Lateral supporting structures: Intact.     EXTENSOR MECHANISM  Intact.     FLUID  Small joint effusion. No substantial Baker's cyst.     OSSEOUS and ARTICULAR STRUCTURES  Bones: Contusion  pattern in the lateral femoral condyle in the region  of popliteal habitus. No fracture or osseous lesion is seen.     Patellofemoral compartment: No hyaline cartilage disease.     Medial compartment: No hyaline cartilage disease.     Lateral compartment: No hyaline cartilage disease.     ANCILLARY FINDINGS  Mild edema in the calf musculature which might represent delayed onset  muscle soreness.                                                                       Impression:  1. Moderate to high grade sprain of the MCL.   2. Subtle tear in the posterior horn of medial meniscus.  Meniscocapsular sprain.  3. Small contusion in the lateral femoral condyle.     I have personally reviewed the examination and initial interpretation  and I agree with the findings.     JUTTA ELLERMANN, MD     Impression:  25-year-old  with a left medial collateral ligament injury.  His knee today actually looks much better than his MRI.  His knee is stable in extension.  We reviewed his MRI.  I told Francesco I am not particularly concerned about his meniscus.  I think this represents more of an MCL injury and capsular injury.  We did discuss surgical management.  I explained that placing an internal brace would be an option but I think that would be overly aggressive.  He agrees.    Plan:  Will continue with his long hinged knee brace.  I stressed the importance of being quite diligent.  He will work with his  on rehab.  Will see him back in approximately 10 to 12 days.    20 minutes was spent on the date of the encounter doing chart review, patient visit, and documentation      Again, thank you for allowing me to participate in the care of your patient.        Sincerely,        Kali Rubalcava MD    Electronically signed

## 2025-03-15 NOTE — PROGRESS NOTES
Chief Complaint: Left knee injury    Date of injury: March 9    History of Present Illness:  Parents a 25-year-old  who had a valgus injury to his left knee against [place].  He had an MRI scan.  He has been quite diligent utilizing his long hinged brace.  He is doing well.  He has much less pain.    Physical Examination:  25-year-old male alert oriented no apparent distress.  Questionable trace effusion.  Range of motion is symmetrical.  He is tender over his medial joint line and tender over his distal MCL.  Negative Lachman.  No posterior drawer.  No opening to varus opening.  There is no increased opening to valgus stress at full extension.  There may be very minimal increased opening at 30 degrees.    Imaging:  MR left knee without contrast 3/10/2025 2:20 PM     Techniques: Multiplanar multisequence imaging of the left knee was  obtained without administration of intra-articular or intravenous  contrast using routine protocol.     History: Presents as MCL injury; Knee injury     Comparison: None     Findings:     MENISCI:  Medial meniscus: Subtle tear in the posterior horn of medial meniscus  communicating with tibial articular surface (series 5 image 10).  Meniscocapsular sprain.  Lateral meniscus: Intact.     LIGAMENTS  Cruciate ligaments: Intact.  Medial supporting structures: Redundancy of MCL evidenced by marked  surrounding fluid and edema. This fluid extends through the pes  anserine bursa. Intermediate signal and thickening at the proximal  attachment.  Lateral supporting structures: Intact.     EXTENSOR MECHANISM  Intact.     FLUID  Small joint effusion. No substantial Baker's cyst.     OSSEOUS and ARTICULAR STRUCTURES  Bones: Contusion pattern in the lateral femoral condyle in the region  of popliteal habitus. No fracture or osseous lesion is seen.     Patellofemoral compartment: No hyaline cartilage disease.     Medial compartment: No hyaline cartilage disease.     Lateral  compartment: No hyaline cartilage disease.     ANCILLARY FINDINGS  Mild edema in the calf musculature which might represent delayed onset  muscle soreness.                                                                       Impression:  1. Moderate to high grade sprain of the MCL.   2. Subtle tear in the posterior horn of medial meniscus.  Meniscocapsular sprain.  3. Small contusion in the lateral femoral condyle.     I have personally reviewed the examination and initial interpretation  and I agree with the findings.     JUTTA ELLERMANN, MD     Impression:  25-year-old  with a left medial collateral ligament injury.  His knee today actually looks much better than his MRI.  His knee is stable in extension.  We reviewed his MRI.  I told Francesco I am not particularly concerned about his meniscus.  I think this represents more of an MCL injury and capsular injury.  We did discuss surgical management.  I explained that placing an internal brace would be an option but I think that would be overly aggressive.  He agrees.    Plan:  Will continue with his long hinged knee brace.  I stressed the importance of being quite diligent.  He will work with his  on rehab.  Will see him back in approximately 10 to 12 days.    20 minutes was spent on the date of the encounter doing chart review, patient visit, and documentation

## (undated) RX ORDER — LIDOCAINE HYDROCHLORIDE 10 MG/ML
INJECTION, SOLUTION EPIDURAL; INFILTRATION; INTRACAUDAL; PERINEURAL
Status: DISPENSED
Start: 2024-12-16

## (undated) RX ORDER — METHYLPREDNISOLONE ACETATE 80 MG/ML
INJECTION, SUSPENSION INTRA-ARTICULAR; INTRALESIONAL; INTRAMUSCULAR; SOFT TISSUE
Status: DISPENSED
Start: 2024-12-16

## (undated) RX ORDER — BUPIVACAINE HYDROCHLORIDE 5 MG/ML
INJECTION, SOLUTION EPIDURAL; INTRACAUDAL
Status: DISPENSED
Start: 2024-12-16

## (undated) RX ORDER — LIDOCAINE HYDROCHLORIDE 10 MG/ML
INJECTION, SOLUTION EPIDURAL; INFILTRATION; INTRACAUDAL; PERINEURAL
Status: DISPENSED
Start: 2023-04-24

## (undated) RX ORDER — BETAMETHASONE SODIUM PHOSPHATE AND BETAMETHASONE ACETATE 3; 3 MG/ML; MG/ML
INJECTION, SUSPENSION INTRA-ARTICULAR; INTRALESIONAL; INTRAMUSCULAR; SOFT TISSUE
Status: DISPENSED
Start: 2023-04-24